# Patient Record
Sex: MALE | Race: WHITE | NOT HISPANIC OR LATINO | URBAN - METROPOLITAN AREA
[De-identification: names, ages, dates, MRNs, and addresses within clinical notes are randomized per-mention and may not be internally consistent; named-entity substitution may affect disease eponyms.]

---

## 2017-02-24 ENCOUNTER — OUTPATIENT (OUTPATIENT)
Dept: OUTPATIENT SERVICES | Facility: HOSPITAL | Age: 72
LOS: 1 days | Discharge: HOME | End: 2017-02-24
Payer: MEDICARE

## 2017-03-02 DIAGNOSIS — F33.9 MAJOR DEPRESSIVE DISORDER, RECURRENT, UNSPECIFIED: ICD-10-CM

## 2017-03-07 PROCEDURE — 90791 PSYCH DIAGNOSTIC EVALUATION: CPT

## 2017-03-29 PROCEDURE — 90870 ELECTROCONVULSIVE THERAPY: CPT

## 2017-03-31 PROCEDURE — 90870 ELECTROCONVULSIVE THERAPY: CPT

## 2017-04-05 PROCEDURE — 90870 ELECTROCONVULSIVE THERAPY: CPT

## 2017-04-07 PROCEDURE — 90870 ELECTROCONVULSIVE THERAPY: CPT

## 2017-04-11 PROCEDURE — 90870 ELECTROCONVULSIVE THERAPY: CPT

## 2017-04-14 PROCEDURE — 90870 ELECTROCONVULSIVE THERAPY: CPT

## 2017-04-17 PROCEDURE — 90870 ELECTROCONVULSIVE THERAPY: CPT

## 2017-04-19 PROCEDURE — 90870 ELECTROCONVULSIVE THERAPY: CPT

## 2017-04-24 PROCEDURE — 90870 ELECTROCONVULSIVE THERAPY: CPT

## 2017-05-01 PROCEDURE — 90870 ELECTROCONVULSIVE THERAPY: CPT

## 2017-05-08 PROCEDURE — 90870 ELECTROCONVULSIVE THERAPY: CPT

## 2017-08-09 ENCOUNTER — TRANSCRIPTION ENCOUNTER (OUTPATIENT)
Age: 72
End: 2017-08-09

## 2017-09-05 ENCOUNTER — APPOINTMENT (OUTPATIENT)
Dept: GERIATRICS | Facility: CLINIC | Age: 72
End: 2017-09-05
Payer: MEDICARE

## 2017-09-05 ENCOUNTER — RX RENEWAL (OUTPATIENT)
Age: 72
End: 2017-09-05

## 2017-09-05 VITALS
HEIGHT: 68.4 IN | SYSTOLIC BLOOD PRESSURE: 120 MMHG | OXYGEN SATURATION: 96 % | HEART RATE: 58 BPM | DIASTOLIC BLOOD PRESSURE: 60 MMHG | RESPIRATION RATE: 20 BRPM | BODY MASS INDEX: 30.56 KG/M2 | WEIGHT: 204 LBS

## 2017-09-05 DIAGNOSIS — Z85.828 PERSONAL HISTORY OF OTHER MALIGNANT NEOPLASM OF SKIN: ICD-10-CM

## 2017-09-05 DIAGNOSIS — Z81.8 FAMILY HISTORY OF OTHER MENTAL AND BEHAVIORAL DISORDERS: ICD-10-CM

## 2017-09-05 DIAGNOSIS — Z78.9 OTHER SPECIFIED HEALTH STATUS: ICD-10-CM

## 2017-09-05 DIAGNOSIS — Z87.891 PERSONAL HISTORY OF NICOTINE DEPENDENCE: ICD-10-CM

## 2017-09-05 DIAGNOSIS — I25.2 OLD MYOCARDIAL INFARCTION: ICD-10-CM

## 2017-09-05 PROCEDURE — 99205 OFFICE O/P NEW HI 60 MIN: CPT

## 2017-09-05 RX ORDER — ALIROCUMAB 75 MG/ML
75 INJECTION, SOLUTION SUBCUTANEOUS
Refills: 0 | Status: ACTIVE | COMMUNITY
Start: 2017-09-05

## 2017-10-24 ENCOUNTER — APPOINTMENT (OUTPATIENT)
Dept: GERIATRICS | Facility: CLINIC | Age: 72
End: 2017-10-24
Payer: MEDICARE

## 2017-10-24 VITALS
WEIGHT: 213 LBS | HEIGHT: 68.4 IN | HEART RATE: 60 BPM | RESPIRATION RATE: 15 BRPM | BODY MASS INDEX: 31.91 KG/M2 | DIASTOLIC BLOOD PRESSURE: 60 MMHG | OXYGEN SATURATION: 97 % | SYSTOLIC BLOOD PRESSURE: 100 MMHG | TEMPERATURE: 97.3 F

## 2017-10-24 LAB
ALBUMIN SERPL ELPH-MCNC: 4.7 G/DL
ALP BLD-CCNC: 63 U/L
ALT SERPL-CCNC: 24 U/L
ANION GAP SERPL CALC-SCNC: 18 MMOL/L
AST SERPL-CCNC: 23 U/L
BASOPHILS # BLD AUTO: 0.01 K/UL
BASOPHILS NFR BLD AUTO: 0.1 %
BILIRUB SERPL-MCNC: 0.6 MG/DL
BUN SERPL-MCNC: 14 MG/DL
CALCIUM SERPL-MCNC: 9.7 MG/DL
CHLORIDE SERPL-SCNC: 101 MMOL/L
CHOLEST SERPL-MCNC: 122 MG/DL
CHOLEST/HDLC SERPL: 1.9 RATIO
CO2 SERPL-SCNC: 23 MMOL/L
CREAT SERPL-MCNC: 0.93 MG/DL
EOSINOPHIL # BLD AUTO: 0.08 K/UL
EOSINOPHIL NFR BLD AUTO: 0.9 %
FOLATE SERPL-MCNC: >20 NG/ML
GLUCOSE SERPL-MCNC: 86 MG/DL
HCT VFR BLD CALC: 42.9 %
HDLC SERPL-MCNC: 65 MG/DL
HGB BLD-MCNC: 14 G/DL
IMM GRANULOCYTES NFR BLD AUTO: 0.1 %
LDLC SERPL CALC-MCNC: 44 MG/DL
LYMPHOCYTES # BLD AUTO: 2.01 K/UL
LYMPHOCYTES NFR BLD AUTO: 23.4 %
MAN DIFF?: NORMAL
MCHC RBC-ENTMCNC: 31 PG
MCHC RBC-ENTMCNC: 32.6 GM/DL
MCV RBC AUTO: 94.9 FL
MONOCYTES # BLD AUTO: 0.94 K/UL
MONOCYTES NFR BLD AUTO: 10.9 %
NEUTROPHILS # BLD AUTO: 5.55 K/UL
NEUTROPHILS NFR BLD AUTO: 64.6 %
PLATELET # BLD AUTO: 127 K/UL
POTASSIUM SERPL-SCNC: 4.7 MMOL/L
PROT SERPL-MCNC: 7 G/DL
RBC # BLD: 4.52 M/UL
RBC # FLD: 13.3 %
SODIUM SERPL-SCNC: 142 MMOL/L
TRIGL SERPL-MCNC: 67 MG/DL
TSH SERPL-ACNC: 1.7 UIU/ML
VIT B12 SERPL-MCNC: 506 PG/ML
WBC # FLD AUTO: 8.6 K/UL

## 2017-10-24 PROCEDURE — 99214 OFFICE O/P EST MOD 30 MIN: CPT | Mod: 25

## 2017-10-24 PROCEDURE — G0008: CPT

## 2017-10-24 PROCEDURE — 90662 IIV NO PRSV INCREASED AG IM: CPT

## 2017-11-08 LAB
BASOPHILS # BLD AUTO: 0.01 K/UL
BASOPHILS NFR BLD AUTO: 0.1 %
EOSINOPHIL # BLD AUTO: 0.15 K/UL
EOSINOPHIL NFR BLD AUTO: 1.6 %
HCT VFR BLD CALC: 41.5 %
HGB BLD-MCNC: 13.6 G/DL
IMM GRANULOCYTES NFR BLD AUTO: 0.2 %
LYMPHOCYTES # BLD AUTO: 2.23 K/UL
LYMPHOCYTES NFR BLD AUTO: 24.3 %
MAN DIFF?: NORMAL
MCHC RBC-ENTMCNC: 31 PG
MCHC RBC-ENTMCNC: 32.8 GM/DL
MCV RBC AUTO: 94.5 FL
MONOCYTES # BLD AUTO: 0.95 K/UL
MONOCYTES NFR BLD AUTO: 10.4 %
NEUTROPHILS # BLD AUTO: 5.8 K/UL
NEUTROPHILS NFR BLD AUTO: 63.4 %
PLATELET # BLD AUTO: 130 K/UL
RBC # BLD: 4.39 M/UL
RBC # FLD: 12.8 %
WBC # FLD AUTO: 9.16 K/UL

## 2018-03-20 ENCOUNTER — APPOINTMENT (OUTPATIENT)
Dept: GERIATRICS | Facility: CLINIC | Age: 73
End: 2018-03-20
Payer: MEDICARE

## 2018-03-20 VITALS
OXYGEN SATURATION: 98 % | HEIGHT: 68.4 IN | WEIGHT: 209.13 LBS | SYSTOLIC BLOOD PRESSURE: 100 MMHG | BODY MASS INDEX: 31.33 KG/M2 | DIASTOLIC BLOOD PRESSURE: 70 MMHG | RESPIRATION RATE: 15 BRPM | HEART RATE: 60 BPM | TEMPERATURE: 97.4 F

## 2018-03-20 PROCEDURE — 99213 OFFICE O/P EST LOW 20 MIN: CPT

## 2018-03-20 RX ORDER — ALPRAZOLAM 0.25 MG/1
0.25 TABLET ORAL
Refills: 0 | Status: ACTIVE | COMMUNITY
Start: 2018-03-20

## 2018-03-20 RX ORDER — EZETIMIBE 10 MG/1
10 TABLET ORAL DAILY
Refills: 0 | Status: ACTIVE | COMMUNITY
Start: 2018-03-20

## 2018-03-20 RX ORDER — LOSARTAN POTASSIUM 50 MG/1
50 TABLET, FILM COATED ORAL DAILY
Qty: 90 | Refills: 3 | Status: ACTIVE | COMMUNITY
Start: 2018-03-20

## 2018-03-20 RX ORDER — ROSUVASTATIN CALCIUM 40 MG/1
40 TABLET, FILM COATED ORAL DAILY
Refills: 0 | Status: ACTIVE | COMMUNITY
Start: 2018-03-20

## 2018-03-20 RX ORDER — ASPIRIN ENTERIC COATED TABLETS 81 MG 81 MG/1
81 TABLET, DELAYED RELEASE ORAL
Refills: 0 | Status: ACTIVE | COMMUNITY
Start: 2018-03-20

## 2018-03-22 LAB
BASOPHILS # BLD AUTO: 0.02 K/UL
BASOPHILS NFR BLD AUTO: 0.2 %
EOSINOPHIL # BLD AUTO: 0.09 K/UL
EOSINOPHIL NFR BLD AUTO: 1 %
HCT VFR BLD CALC: 42.5 %
HGB BLD-MCNC: 14 G/DL
IMM GRANULOCYTES NFR BLD AUTO: 0.1 %
LYMPHOCYTES # BLD AUTO: 2.14 K/UL
LYMPHOCYTES NFR BLD AUTO: 24.8 %
MAN DIFF?: NORMAL
MCHC RBC-ENTMCNC: 31.3 PG
MCHC RBC-ENTMCNC: 32.9 GM/DL
MCV RBC AUTO: 95.1 FL
MONOCYTES # BLD AUTO: 0.87 K/UL
MONOCYTES NFR BLD AUTO: 10.1 %
NEUTROPHILS # BLD AUTO: 5.51 K/UL
NEUTROPHILS NFR BLD AUTO: 63.8 %
PLATELET # BLD AUTO: 117 K/UL
RBC # BLD: 4.47 M/UL
RBC # FLD: 12.8 %
WBC # FLD AUTO: 8.64 K/UL

## 2018-09-18 ENCOUNTER — APPOINTMENT (OUTPATIENT)
Dept: GERIATRICS | Facility: CLINIC | Age: 73
End: 2018-09-18
Payer: MEDICARE

## 2018-09-18 VITALS
HEART RATE: 59 BPM | HEIGHT: 68.4 IN | SYSTOLIC BLOOD PRESSURE: 90 MMHG | RESPIRATION RATE: 15 BRPM | DIASTOLIC BLOOD PRESSURE: 60 MMHG | TEMPERATURE: 97.4 F | OXYGEN SATURATION: 96 % | WEIGHT: 196.19 LBS | BODY MASS INDEX: 29.39 KG/M2

## 2018-09-18 PROCEDURE — 99214 OFFICE O/P EST MOD 30 MIN: CPT

## 2018-09-18 RX ORDER — ASPIRIN 81 MG/1
81 TABLET ORAL DAILY
Refills: 0 | Status: DISCONTINUED | COMMUNITY
Start: 2017-09-05

## 2019-03-12 ENCOUNTER — APPOINTMENT (OUTPATIENT)
Dept: GERIATRICS | Facility: CLINIC | Age: 74
End: 2019-03-12
Payer: MEDICARE

## 2019-03-12 VITALS
TEMPERATURE: 97.6 F | OXYGEN SATURATION: 98 % | SYSTOLIC BLOOD PRESSURE: 110 MMHG | DIASTOLIC BLOOD PRESSURE: 82 MMHG | WEIGHT: 200 LBS | BODY MASS INDEX: 30.05 KG/M2 | HEART RATE: 67 BPM

## 2019-03-12 DIAGNOSIS — Z60.2 PROBLEMS RELATED TO LIVING ALONE: ICD-10-CM

## 2019-03-12 PROCEDURE — 99214 OFFICE O/P EST MOD 30 MIN: CPT

## 2019-03-12 RX ORDER — HYDROCORTISONE 10 MG/G
1 CREAM TOPICAL TWICE DAILY
Qty: 1 | Refills: 0 | Status: ACTIVE | COMMUNITY
Start: 2019-03-12 | End: 1900-01-01

## 2019-03-12 RX ORDER — VORTIOXETINE 20 MG/1
20 TABLET, FILM COATED ORAL
Refills: 0 | Status: ACTIVE | COMMUNITY
Start: 2018-03-20

## 2019-03-12 RX ORDER — OLANZAPINE 5 MG/1
5 TABLET, FILM COATED ORAL
Refills: 0 | Status: ACTIVE | COMMUNITY
Start: 2018-03-20

## 2019-03-12 SDOH — SOCIAL STABILITY - SOCIAL INSECURITY: PROBLEMS RELATED TO LIVING ALONE: Z60.2

## 2019-03-12 NOTE — HISTORY OF PRESENT ILLNESS
[0] : 1) Little interest or pleasure doing things: Not at all [1] : 2) Feeling down, depressed, or hopeless for several days [FreeTextEntry1] : 72 y/o M w/ PMH of CAD, HTN, Depression presents today for 6 month routine f/u visit. No acute complaints today. \par \par CAD/HTN: Was seen in by Cardiology Dr. Kimble 01/15/19. Had lab work done at that time which was wnl. His BP runs around 110/80 at home.  Takes losartan and praluent injections at home. Compliant with medications. No chest pain, no palpitations, no SOB. Exercises every day on treadmill 35 min. Has been on weight watchers and has lost 13lbs. \par \par HLD: takes praluent injections. Has lipid panel done with cardiology with readings within all normal rage. \par \par Depression: sees psychiatry Dr. Britton in [place]; seen last week. Takes olanzapine daily. Was also started on doxepin 4 months ago which has helped his mood.  Denies suicidal/homicidal ideations. \par \par Thrombocytopenia: plt 121 on last labs. Has been chronically low. No acute bleeding. No bruising. \par \par Seborrheic Dermatitis: Rash on outer ears, forearm and hands. States he has been scratching a lot due to itching on his arms.\par \par Lives alone at home. No HHA. No recent hospitalizations or ED visits. No recent falls. Independent of ADLs and IADLs. Drives. No gait assistance. Has children nearby who help and daughter in law who is a nurse practitioner. He works part time collecting rents of real estate homes. \par

## 2019-03-12 NOTE — ASSESSMENT
[Daily physical exercise] : daily physical exercise [Socialization: _____] : socialization: [unfilled] [Medications discussed: _____] : Medications discussed: [unfilled] [Daily physical exercise as tolerated] : Daily physical exercise as tolerated [Weight loss counseling given] : Weight loss counseling given [Social support] : social support [Driving] : driving [Medication Management] : medication management [Lab Results] : lab results [No changes since last discussed ___] : No changes since last discussed  [unfilled]

## 2019-03-12 NOTE — PHYSICAL EXAM
[General Appearance - Alert] : alert [General Appearance - In No Acute Distress] : in no acute distress [General Appearance - Well Nourished] : well nourished [General Appearance - Well Developed] : well developed [Extraocular Movements] : extraocular movements were intact [Normal Oral Mucosa] : normal oral mucosa [] : the neck was supple [Neck Cervical Mass (___cm)] : no neck mass was observed [Auscultation Breath Sounds / Voice Sounds] : lungs were clear to auscultation bilaterally [Heart Rate And Rhythm] : heart rate was normal and rhythm regular [Heart Sounds] : normal S1 and S2 [Murmurs] : no murmurs [Bowel Sounds] : normal bowel sounds [Abdomen Soft] : soft [Abdomen Tenderness] : non-tender [Abdomen Mass (___ Cm)] : no abdominal mass palpated [Cervical Lymph Nodes Enlarged Posterior Bilaterally] : posterior cervical [Cervical Lymph Nodes Enlarged Anterior Bilaterally] : anterior cervical [Supraclavicular Lymph Nodes Enlarged Bilaterally] : supraclavicular [No Focal Deficits] : no focal deficits [Sclera] : the sclera and conjunctiva were normal [PERRL With Normal Accommodation] : pupils were equal in size, round, and reactive to light [Edema] : there was no peripheral edema [Abnormal Walk] : normal gait [Involuntary Movements] : no involuntary movements were seen [Excoriated] : that was excoriated [Upper Extremities] : on the upper extremities [Affect] : the affect was normal [Mood] : the mood was normal [FreeTextEntry1] : bilateral excoriations of forearms with scabs.

## 2019-03-12 NOTE — REVIEW OF SYSTEMS
[Recent Weight Loss (___ Lbs)] : recent [unfilled] ~Ulb weight loss [Skin Lesions] : skin lesion [Itching] : itching [Dry Skin] : dry skin [Depression] : depression [As Noted in HPI] : as noted in HPI [Negative] : Heme/Lymph [Fever] : no fever [Chills] : no chills [Chest Pain] : no chest pain [Palpitations] : no palpitations [Lower Ext Edema] : no extremity edema [Shortness Of Breath] : no shortness of breath [Cough] : no cough [SOB on Exertion] : no shortness of breath during exertion [Abdominal Pain] : no abdominal pain [Constipation] : no constipation [Incontinence] : no incontinence [Hesitancy] : no urinary hesitancy [Joint Pain] : no joint pain [Dizziness] : no dizziness [Suicidal] : not suicidal [Sleep Disturbances] : no sleep disturbances [Anxiety] : no anxiety [Change In Personality] : no personality change [Emotional Problems] : no emotional problems [Muscle Weakness] : no muscle weakness [Easy Bleeding] : no tendency for easy bleeding [Easy Bruising] : no tendency for easy bruising [FreeTextEntry2] : Intentional  [FreeTextEntry4] : itchy ears

## 2019-03-12 NOTE — SOCIAL HISTORY
[No falls in past year] : Patient reported no falls in the past year [Fully functional (bathing, dressing, toileting, transferring, walking, feeding)] : Fully functional (bathing, dressing, toileting, transferring, walking, feeding) [Fully functional (using the telephone, shopping, preparing meals, housekeeping, doing laundry, using transportation,] : Fully functional and needs no help or supervision to perform IADLs (using the telephone, shopping, preparing meals, housekeeping, doing laundry, using transportation, managing medications and managing finances)

## 2019-03-13 LAB
ANION GAP SERPL CALC-SCNC: 10 MMOL/L
BASOPHILS # BLD AUTO: 0.03 K/UL
BASOPHILS NFR BLD AUTO: 0.3 %
BUN SERPL-MCNC: 15 MG/DL
CALCIUM SERPL-MCNC: 9.3 MG/DL
CHLORIDE SERPL-SCNC: 103 MMOL/L
CO2 SERPL-SCNC: 26 MMOL/L
CREAT SERPL-MCNC: 0.97 MG/DL
EOSINOPHIL # BLD AUTO: 0.23 K/UL
EOSINOPHIL NFR BLD AUTO: 2.5 %
GLUCOSE SERPL-MCNC: 89 MG/DL
HCT VFR BLD CALC: 43.6 %
HGB BLD-MCNC: 14.3 G/DL
IMM GRANULOCYTES NFR BLD AUTO: 0.6 %
LYMPHOCYTES # BLD AUTO: 2.04 K/UL
LYMPHOCYTES NFR BLD AUTO: 22.5 %
MAN DIFF?: NORMAL
MCHC RBC-ENTMCNC: 31.9 PG
MCHC RBC-ENTMCNC: 32.8 GM/DL
MCV RBC AUTO: 97.3 FL
MONOCYTES # BLD AUTO: 0.61 K/UL
MONOCYTES NFR BLD AUTO: 6.7 %
NEUTROPHILS # BLD AUTO: 6.1 K/UL
NEUTROPHILS NFR BLD AUTO: 67.4 %
PLATELET # BLD AUTO: 118 K/UL
POTASSIUM SERPL-SCNC: 4.7 MMOL/L
RBC # BLD: 4.48 M/UL
RBC # FLD: 12 %
SODIUM SERPL-SCNC: 139 MMOL/L
WBC # FLD AUTO: 9.06 K/UL

## 2019-09-10 ENCOUNTER — APPOINTMENT (OUTPATIENT)
Dept: GERIATRICS | Facility: CLINIC | Age: 74
End: 2019-09-10
Payer: MEDICARE

## 2019-09-10 VITALS
WEIGHT: 203 LBS | BODY MASS INDEX: 30.77 KG/M2 | HEIGHT: 68 IN | HEART RATE: 67 BPM | OXYGEN SATURATION: 98 % | RESPIRATION RATE: 15 BRPM | DIASTOLIC BLOOD PRESSURE: 70 MMHG | TEMPERATURE: 98.6 F | SYSTOLIC BLOOD PRESSURE: 110 MMHG

## 2019-09-10 DIAGNOSIS — L30.9 DERMATITIS, UNSPECIFIED: ICD-10-CM

## 2019-09-10 PROCEDURE — 99214 OFFICE O/P EST MOD 30 MIN: CPT

## 2019-09-10 RX ORDER — DOXEPIN HYDROCHLORIDE 75 MG/1
75 CAPSULE ORAL
Refills: 0 | Status: ACTIVE | COMMUNITY
Start: 2019-03-12

## 2019-09-10 NOTE — END OF VISIT
[] : Resident [FreeTextEntry3] : the patient was seen and examined in entirety. The patient is a 73-year-old man with history of coronary disease, hypertension, depression. He has no complaints.He denies chest pain shortness of breath or palpitations.He has excellent exercise tolerance. Will proceed as outlined above.\par

## 2019-09-10 NOTE — HISTORY OF PRESENT ILLNESS
[FreeTextEntry1] : 72 y/o M w/ PMH of CAD, HTN, HLD, Thrombocytopenia (Last Platelet levels was 118K/uL last visit) , Depression presents today for 6 month routine f/u visit. Currently refers that he recently had stress test with his Cardiologist Dr Kimble. NST was negative for ischemia. Normal LV EF. Recent blood work reviewed showing Lipid Panel WNL. Platelets level stable at 111K with no episode of bleeding. No GI or  symptoms. No falls.  \par \par Social Status: Lives alone at home. No HHA. No recent hospitalizations or ED visits. No recent falls. \par Functional Status wise: Independent of ADLs and IADLs. Currently drives and does 20 minutes of  exercise.  \par

## 2019-09-10 NOTE — PHYSICAL EXAM
[General Appearance - Alert] : alert [Sclera] : the sclera and conjunctiva were normal [General Appearance - In No Acute Distress] : in no acute distress [Normal Oral Mucosa] : normal oral mucosa [Neck Appearance] : the appearance of the neck was normal [] : no respiratory distress [Auscultation Breath Sounds / Voice Sounds] : lungs were clear to auscultation bilaterally [Heart Sounds] : normal S1 and S2 [Heart Rate And Rhythm] : heart rate was normal and rhythm regular [Heart Sounds Gallop] : no gallops [Murmurs] : no murmurs [Heart Sounds Pericardial Friction Rub] : no pericardial rub [Bowel Sounds] : normal bowel sounds [Abdomen Soft] : soft [Abdomen Tenderness] : non-tender [Oriented To Time, Place, And Person] : oriented to person, place, and time [Impaired Insight] : insight and judgment were intact [Affect] : the affect was normal [TWNoteComboBox1] : Negative: 3 recalled words

## 2020-03-10 ENCOUNTER — APPOINTMENT (OUTPATIENT)
Dept: GERIATRICS | Facility: CLINIC | Age: 75
End: 2020-03-10
Payer: MEDICARE

## 2020-03-10 VITALS
BODY MASS INDEX: 32.28 KG/M2 | DIASTOLIC BLOOD PRESSURE: 78 MMHG | OXYGEN SATURATION: 98 % | TEMPERATURE: 97.6 F | HEART RATE: 79 BPM | WEIGHT: 213 LBS | HEIGHT: 68 IN | RESPIRATION RATE: 14 BRPM | SYSTOLIC BLOOD PRESSURE: 120 MMHG

## 2020-03-10 DIAGNOSIS — D69.6 THROMBOCYTOPENIA, UNSPECIFIED: ICD-10-CM

## 2020-03-10 DIAGNOSIS — Z23 ENCOUNTER FOR IMMUNIZATION: ICD-10-CM

## 2020-03-10 DIAGNOSIS — I10 ESSENTIAL (PRIMARY) HYPERTENSION: ICD-10-CM

## 2020-03-10 DIAGNOSIS — E78.5 HYPERLIPIDEMIA, UNSPECIFIED: ICD-10-CM

## 2020-03-10 DIAGNOSIS — I25.10 ATHEROSCLEROTIC HEART DISEASE OF NATIVE CORONARY ARTERY W/OUT ANGINA PECTORIS: ICD-10-CM

## 2020-03-10 DIAGNOSIS — F32.9 MAJOR DEPRESSIVE DISORDER, SINGLE EPISODE, UNSPECIFIED: ICD-10-CM

## 2020-03-10 DIAGNOSIS — F41.9 ANXIETY DISORDER, UNSPECIFIED: ICD-10-CM

## 2020-03-10 PROCEDURE — G0008: CPT

## 2020-03-10 PROCEDURE — 90662 IIV NO PRSV INCREASED AG IM: CPT

## 2020-03-10 PROCEDURE — 99213 OFFICE O/P EST LOW 20 MIN: CPT

## 2020-03-10 NOTE — PHYSICAL EXAM
[General Appearance - Alert] : alert [General Appearance - In No Acute Distress] : in no acute distress [Sclera] : the sclera and conjunctiva were normal [PERRL With Normal Accommodation] : pupils were equal in size, round, and reactive to light [Extraocular Movements] : extraocular movements were intact [Normal Oral Mucosa] : normal oral mucosa [No Oral Pallor] : no oral pallor [No Oral Cyanosis] : no oral cyanosis [Outer Ear] : the ears and nose were normal in appearance [Oropharynx] : The oropharynx was normal [Neck Appearance] : the appearance of the neck was normal [Neck Cervical Mass (___cm)] : no neck mass was observed [Jugular Venous Distention Increased] : there was no jugular-venous distention [Thyroid Diffuse Enlargement] : the thyroid was not enlarged [Thyroid Nodule] : there were no palpable thyroid nodules [Auscultation Breath Sounds / Voice Sounds] : lungs were clear to auscultation bilaterally [Heart Rate And Rhythm] : heart rate was normal and rhythm regular [Heart Sounds] : normal S1 and S2 [Heart Sounds Gallop] : no gallops [Murmurs] : no murmurs [Heart Sounds Pericardial Friction Rub] : no pericardial rub [Full Pulse] : the pedal pulses are present [Edema] : there was no peripheral edema [Breast Appearance] : normal in appearance [Breast Palpation Mass] : no palpable masses [Bowel Sounds] : normal bowel sounds [Abdomen Soft] : soft [Abdomen Tenderness] : non-tender [Abdomen Mass (___ Cm)] : no abdominal mass palpated [No CVA Tenderness] : no ~M costovertebral angle tenderness [No Spinal Tenderness] : no spinal tenderness [Skin Color & Pigmentation] : normal skin color and pigmentation [Skin Turgor] : normal skin turgor [] : no rash [Oriented To Time, Place, And Person] : oriented to person, place, and time [Impaired Insight] : insight and judgment were intact [Affect] : the affect was normal

## 2020-03-10 NOTE — HISTORY OF PRESENT ILLNESS
[2] : 2) Feeling down, depressed, or hopeless for more than half of the days [FreeTextEntry1] : 73 yo male here for fu of cad, htn, hyperlipidemia.  Pt feel well. 1 mile daily on treadmill. No sob no cp.

## 2020-03-10 NOTE — ASSESSMENT
[FreeTextEntry1] : 75 yo male with hx of depression , anxiety, cad,hyperliidemia. Thrombocytopenia.  Will give flu shot today.  Pt advised to get colonoscopy this year.

## 2020-03-12 ENCOUNTER — TRANSCRIPTION ENCOUNTER (OUTPATIENT)
Age: 75
End: 2020-03-12

## 2020-03-30 ENCOUNTER — APPOINTMENT (OUTPATIENT)
Dept: OTOLARYNGOLOGY | Facility: CLINIC | Age: 75
End: 2020-03-30

## 2020-06-10 LAB
ANION GAP SERPL CALC-SCNC: 12 MMOL/L
BASOPHILS # BLD AUTO: 0.03 K/UL
BASOPHILS NFR BLD AUTO: 0.4 %
BUN SERPL-MCNC: 14 MG/DL
CALCIUM SERPL-MCNC: 9.3 MG/DL
CHLORIDE SERPL-SCNC: 103 MMOL/L
CO2 SERPL-SCNC: 26 MMOL/L
CREAT SERPL-MCNC: 0.9 MG/DL
EOSINOPHIL # BLD AUTO: 0.13 K/UL
EOSINOPHIL NFR BLD AUTO: 1.7 %
GLUCOSE SERPL-MCNC: 104 MG/DL
HCT VFR BLD CALC: 46.7 %
HGB BLD-MCNC: 15.1 G/DL
IMM GRANULOCYTES NFR BLD AUTO: 0.3 %
LYMPHOCYTES # BLD AUTO: 2.15 K/UL
LYMPHOCYTES NFR BLD AUTO: 28.8 %
MAN DIFF?: NORMAL
MCHC RBC-ENTMCNC: 31.1 PG
MCHC RBC-ENTMCNC: 32.3 GM/DL
MCV RBC AUTO: 96.1 FL
MONOCYTES # BLD AUTO: 0.59 K/UL
MONOCYTES NFR BLD AUTO: 7.9 %
NEUTROPHILS # BLD AUTO: 4.54 K/UL
NEUTROPHILS NFR BLD AUTO: 60.9 %
PLATELET # BLD AUTO: 113 K/UL
POTASSIUM SERPL-SCNC: 4.4 MMOL/L
RBC # BLD: 4.86 M/UL
RBC # FLD: 11.9 %
SODIUM SERPL-SCNC: 141 MMOL/L
WBC # FLD AUTO: 7.46 K/UL

## 2021-03-25 ENCOUNTER — OUTPATIENT (OUTPATIENT)
Dept: OUTPATIENT SERVICES | Facility: HOSPITAL | Age: 76
LOS: 1 days | Discharge: HOME | End: 2021-03-25

## 2021-04-02 NOTE — ECT CONSULT NOTE - NSECTMENTALSTATUSEXAM_PSY_ALL_CORE
Conscious, cooperative, alert. Registered and recalled 3/3 words  Speech: regular rate and rhythm,   Mood: "Depressed"   Thought Process: linear, goal directed   Thought Content: No Death wish, No Suicidal ideation/intent/plan, No homicidal ideation/intent/plan. No delusions   Perception: No hallucinations   Insight and Judgement: fair  Impulse Control: fair at this time.

## 2021-04-02 NOTE — ECT CONSULT NOTE - NSECTASSESSRECOMM_PSY_ALL_CORE
A course of ECT is indicated.    The patient encounter was from 14:15 pm to 15:15 pm.      More than 50% of the time was spent in counselling and/or coordination of care, including but not limited to discussing with patient the risks and benefits of ECT, the usual trajectory of response with acute course of ECT, obtaining informed consent for ECT, reviewing ECT fasting guidelines, reviewing the need for periodic history and physical and labwork. Patient was asked to obtain labwork (CBC, BMP, COVID-19), EKG and CARDIOLOGY clearance.     He was advised to hold lamotrigine dose on day of tx.    Referring psychiatrist was contacted, left message.

## 2021-04-02 NOTE — ECT CONSULT NOTE - OTHER PAST PSYCHIATRIC HISTORY (INCLUDE DETAILS REGARDING ONSET, COURSE OF ILLNESS, INPATIENT/OUTPATIENT TREATMENT)
Patient was interviewed by phone at his preference and with his consent, patient at home in [facility], writer in [facility].     76 year old father of four with 10 grandchildren, domiciled, not / has girlfriend of 10 years, past psychiatric history of major depressive disorder, recurrent/severe without psychotic features, had 11 ECT in 2017 (9 acute and 2 maintenance, bifrontal, with good result); no hospitalizations or suicide attempts recorded; no history of violence, legal, or substance use disorder noted.     Depression began in the 5th decade of life but episodes have become more severe in the last several years. 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 C/O depressed mood "down in the dumps," anhedonia (would usually enjoy going out with his girlfriend), anergia, insomnia (improved with quetiapine), guilt "I'm not doing well in my business," and passive suicidal ideation without active suicidal ideation or intent/plan. Denied homicidal ideation, appetite changes, concentration or memory difficulties.     C/O mild anxiety "a little bit," no panic attacks.

## 2021-04-02 NOTE — ECT CONSULT NOTE - NSSUICPROTFACT_PSY_ALL_CORE
Responsibility to children, family, or others/Identifies reasons for living/Supportive social network of family or friends/Fear of death or the actual act of killing self/Cultural, spiritual and/or moral attitudes against suicide/Engaged in work or school/Positive therapeutic relationships/Ability to cope with stress/Frustration tolerance

## 2021-04-02 NOTE — ECT CONSULT NOTE - CURRENT MEDICATION
MEDICATIONS  (STANDING): trintellix 20 mg qam, lamotrigine 50 mg qam, ezetimibe 10 mg qam, losartan 25 mg qam, rosuvastatin 40 mg qam, ASA 81 mg qam, alprazolam 2.5 mg qam and 1.25 bid    MEDICATIONS  (PRN):

## 2021-07-12 ENCOUNTER — TRANSCRIPTION ENCOUNTER (OUTPATIENT)
Age: 76
End: 2021-07-12

## 2021-07-25 ENCOUNTER — TRANSCRIPTION ENCOUNTER (OUTPATIENT)
Age: 76
End: 2021-07-25

## 2021-10-25 ENCOUNTER — EMERGENCY (EMERGENCY)
Facility: HOSPITAL | Age: 76
LOS: 1 days | Discharge: ANOTHER HOSPITAL | End: 2021-10-25
Attending: EMERGENCY MEDICINE | Admitting: EMERGENCY MEDICINE
Payer: MEDICARE

## 2021-10-25 VITALS
SYSTOLIC BLOOD PRESSURE: 141 MMHG | DIASTOLIC BLOOD PRESSURE: 84 MMHG | HEART RATE: 74 BPM | WEIGHT: 177.91 LBS | OXYGEN SATURATION: 98 % | RESPIRATION RATE: 20 BRPM | HEIGHT: 68 IN | TEMPERATURE: 98 F

## 2021-10-25 LAB
ANION GAP SERPL CALC-SCNC: 9 MMOL/L — SIGNIFICANT CHANGE UP (ref 5–17)
BASOPHILS # BLD AUTO: 0.02 K/UL — SIGNIFICANT CHANGE UP (ref 0–0.2)
BASOPHILS NFR BLD AUTO: 0.3 % — SIGNIFICANT CHANGE UP (ref 0–2)
BUN SERPL-MCNC: 22 MG/DL — SIGNIFICANT CHANGE UP (ref 7–23)
CALCIUM SERPL-MCNC: 9.2 MG/DL — SIGNIFICANT CHANGE UP (ref 8.4–10.5)
CHLORIDE SERPL-SCNC: 98 MMOL/L — SIGNIFICANT CHANGE UP (ref 96–108)
CO2 SERPL-SCNC: 26 MMOL/L — SIGNIFICANT CHANGE UP (ref 22–31)
CREAT SERPL-MCNC: 1.17 MG/DL — SIGNIFICANT CHANGE UP (ref 0.5–1.3)
EOSINOPHIL # BLD AUTO: 0.12 K/UL — SIGNIFICANT CHANGE UP (ref 0–0.5)
EOSINOPHIL NFR BLD AUTO: 1.5 % — SIGNIFICANT CHANGE UP (ref 0–6)
GLUCOSE SERPL-MCNC: 101 MG/DL — HIGH (ref 70–99)
HCT VFR BLD CALC: 42.5 % — SIGNIFICANT CHANGE UP (ref 39–50)
HGB BLD-MCNC: 13.8 G/DL — SIGNIFICANT CHANGE UP (ref 13–17)
IMM GRANULOCYTES NFR BLD AUTO: 0.3 % — SIGNIFICANT CHANGE UP (ref 0–1.5)
LYMPHOCYTES # BLD AUTO: 1.65 K/UL — SIGNIFICANT CHANGE UP (ref 1–3.3)
LYMPHOCYTES # BLD AUTO: 20.8 % — SIGNIFICANT CHANGE UP (ref 13–44)
MCHC RBC-ENTMCNC: 30.2 PG — SIGNIFICANT CHANGE UP (ref 27–34)
MCHC RBC-ENTMCNC: 32.5 GM/DL — SIGNIFICANT CHANGE UP (ref 32–36)
MCV RBC AUTO: 93 FL — SIGNIFICANT CHANGE UP (ref 80–100)
MONOCYTES # BLD AUTO: 0.73 K/UL — SIGNIFICANT CHANGE UP (ref 0–0.9)
MONOCYTES NFR BLD AUTO: 9.2 % — SIGNIFICANT CHANGE UP (ref 2–14)
NEUTROPHILS # BLD AUTO: 5.41 K/UL — SIGNIFICANT CHANGE UP (ref 1.8–7.4)
NEUTROPHILS NFR BLD AUTO: 67.9 % — SIGNIFICANT CHANGE UP (ref 43–77)
NRBC # BLD: 0 /100 WBCS — SIGNIFICANT CHANGE UP (ref 0–0)
PLATELET # BLD AUTO: 140 K/UL — LOW (ref 150–400)
POTASSIUM SERPL-MCNC: 4.6 MMOL/L — SIGNIFICANT CHANGE UP (ref 3.5–5.3)
POTASSIUM SERPL-SCNC: 4.6 MMOL/L — SIGNIFICANT CHANGE UP (ref 3.5–5.3)
RBC # BLD: 4.57 M/UL — SIGNIFICANT CHANGE UP (ref 4.2–5.8)
RBC # FLD: 13.1 % — SIGNIFICANT CHANGE UP (ref 10.3–14.5)
SODIUM SERPL-SCNC: 133 MMOL/L — LOW (ref 135–145)
WBC # BLD: 7.95 K/UL — SIGNIFICANT CHANGE UP (ref 3.8–10.5)
WBC # FLD AUTO: 7.95 K/UL — SIGNIFICANT CHANGE UP (ref 3.8–10.5)

## 2021-10-25 PROCEDURE — 99285 EMERGENCY DEPT VISIT HI MDM: CPT

## 2021-10-25 PROCEDURE — 70450 CT HEAD/BRAIN W/O DYE: CPT | Mod: 26,MA

## 2021-10-25 RX ORDER — ROSUVASTATIN CALCIUM 5 MG/1
1 TABLET ORAL
Qty: 0 | Refills: 0 | DISCHARGE

## 2021-10-25 RX ORDER — SODIUM CHLORIDE 9 MG/ML
1000 INJECTION INTRAMUSCULAR; INTRAVENOUS; SUBCUTANEOUS ONCE
Refills: 0 | Status: COMPLETED | OUTPATIENT
Start: 2021-10-25 | End: 2021-10-25

## 2021-10-25 RX ORDER — EZETIMIBE 10 MG/1
1 TABLET ORAL
Qty: 0 | Refills: 0 | DISCHARGE

## 2021-10-25 RX ORDER — ASPIRIN/CALCIUM CARB/MAGNESIUM 324 MG
1 TABLET ORAL
Qty: 0 | Refills: 0 | DISCHARGE

## 2021-10-25 RX ORDER — ALPRAZOLAM 0.25 MG
0 TABLET ORAL
Qty: 0 | Refills: 0 | DISCHARGE

## 2021-10-25 RX ORDER — LAMOTRIGINE 25 MG/1
0 TABLET, ORALLY DISINTEGRATING ORAL
Qty: 0 | Refills: 0 | DISCHARGE

## 2021-10-25 RX ORDER — QUETIAPINE FUMARATE 200 MG/1
0 TABLET, FILM COATED ORAL
Qty: 0 | Refills: 0 | DISCHARGE

## 2021-10-25 RX ORDER — VORTIOXETINE 5 MG/1
1 TABLET, FILM COATED ORAL
Qty: 0 | Refills: 0 | DISCHARGE

## 2021-10-25 RX ORDER — BUPROPION HYDROCHLORIDE 150 MG/1
1 TABLET, EXTENDED RELEASE ORAL
Qty: 0 | Refills: 0 | DISCHARGE

## 2021-10-25 RX ADMIN — SODIUM CHLORIDE 1000 MILLILITER(S): 9 INJECTION INTRAMUSCULAR; INTRAVENOUS; SUBCUTANEOUS at 23:46

## 2021-10-25 NOTE — ED PROVIDER NOTE - CARE PLAN
Principal Discharge DX:	Subdural hematoma  Secondary Diagnosis:	Subarachnoid hematoma   1 Principal Discharge DX:	Subdural hematoma  Secondary Diagnosis:	Subarachnoid hematoma  Secondary Diagnosis:	Scalp abrasion

## 2021-10-25 NOTE — ED ADULT NURSE NOTE - CAS DISCH ACCOMP BY
Patient came to lobby and said she is seeing ortho on Monday, 10/18 and needs note from PCP giving ortho order. Pt is waiting for the copy.  Please cosign ortho order and return message. thanks   Self

## 2021-10-25 NOTE — ED PROVIDER NOTE - NSICDXPASTMEDICALHX_GEN_ALL_CORE_FT
PAST MEDICAL HISTORY:  Anxiety     CAD (coronary artery disease) of artery bypass graft     High cholesterol

## 2021-10-25 NOTE — ED ADULT NURSE NOTE - OBJECTIVE STATEMENT
Slipped on gravel and fell backwards, abrasion to alexander of head. No n/v, no LOC.  Denies any complaints.

## 2021-10-25 NOTE — ED PROVIDER NOTE - OBJECTIVE STATEMENT
76 y.o. M fell, states he was on the side of the road, a car was coming so he moved over, but slipped on the gravel, bystander (), helped pt home and called pt's son stating the pt seemed a bit disoriented and unsteady on his feet, fall occurred a few hours ago, when son arrived at the house, also noted pt more unsteady than usual and a little foggy on the details of the fall, does seem to be improving that way, but still more unsteady than usual, pt denies neck/back pain, denies pain in extremities 76 y.o. M fell, states he was on the side of the road, a car was coming so he moved over, but slipped on the gravel, bystander (), helped pt home and called pt's son stating the pt seemed a bit disoriented and unsteady on his feet, no LOC, fall occurred a few hours ago, when son arrived at the house, also noted pt more unsteady than usual and a little foggy on the details of the fall, does seem to be improving that way, but still more unsteady than usual, pt denies neck/back pain, denies pain in extremities, now in ED, son states mental status normal, pt continues to feel more "wobbly" than normal

## 2021-10-25 NOTE — ED PROVIDER NOTE - CLINICAL SUMMARY MEDICAL DECISION MAKING FREE TEXT BOX
fall, witnessed by PD, no LOC, initial report of disorientation - per son resolved, persistent issue is unsteady gait, pt lives alone - iv, basic labs, urine, ct head r/o ich, wound care by RN

## 2021-10-25 NOTE — ED ADULT NURSE NOTE - NSIMPLEMENTINTERV_GEN_ALL_ED
Implemented All Fall Risk Interventions:  [place] to call system. Call bell, personal items and telephone within reach. Instruct patient to call for assistance. Room bathroom lighting operational. Non-slip footwear when patient is off stretcher. Physically safe environment: no spills, clutter or unnecessary equipment. Stretcher in lowest position, wheels locked, appropriate side rails in place. Provide visual cue, wrist band, yellow gown, etc. Monitor gait and stability. Monitor for mental status changes and reorient to person, place, and time. Review medications for side effects contributing to fall risk. Reinforce activity limits and safety measures with patient and family.

## 2021-10-26 ENCOUNTER — INPATIENT (INPATIENT)
Facility: HOSPITAL | Age: 76
LOS: 0 days | Discharge: HOME | DRG: 87 | End: 2021-10-27
Attending: NEUROLOGICAL SURGERY | Admitting: NEUROLOGICAL SURGERY
Payer: MEDICARE

## 2021-10-26 VITALS
RESPIRATION RATE: 18 BRPM | DIASTOLIC BLOOD PRESSURE: 76 MMHG | HEART RATE: 70 BPM | OXYGEN SATURATION: 98 % | TEMPERATURE: 98 F | SYSTOLIC BLOOD PRESSURE: 160 MMHG

## 2021-10-26 VITALS
TEMPERATURE: 98 F | SYSTOLIC BLOOD PRESSURE: 146 MMHG | OXYGEN SATURATION: 96 % | RESPIRATION RATE: 20 BRPM | HEART RATE: 76 BPM | DIASTOLIC BLOOD PRESSURE: 85 MMHG

## 2021-10-26 DIAGNOSIS — S06.5X9A TRAUMATIC SUBDURAL HEMORRHAGE WITH LOSS OF CONSCIOUSNESS OF UNSPECIFIED DURATION, INITIAL ENCOUNTER: ICD-10-CM

## 2021-10-26 DIAGNOSIS — Z95.1 PRESENCE OF AORTOCORONARY BYPASS GRAFT: Chronic | ICD-10-CM

## 2021-10-26 LAB
ALBUMIN SERPL ELPH-MCNC: 4.4 G/DL — SIGNIFICANT CHANGE UP (ref 3.3–5.2)
ALP SERPL-CCNC: 81 U/L — SIGNIFICANT CHANGE UP (ref 40–120)
ALT FLD-CCNC: 16 U/L — SIGNIFICANT CHANGE UP
ANION GAP SERPL CALC-SCNC: 16 MMOL/L — SIGNIFICANT CHANGE UP (ref 5–17)
APPEARANCE UR: CLEAR — SIGNIFICANT CHANGE UP
APTT BLD: 28 SEC — SIGNIFICANT CHANGE UP (ref 27.5–35.5)
AST SERPL-CCNC: 26 U/L — SIGNIFICANT CHANGE UP
BASOPHILS # BLD AUTO: 0.02 K/UL — SIGNIFICANT CHANGE UP (ref 0–0.2)
BASOPHILS NFR BLD AUTO: 0.2 % — SIGNIFICANT CHANGE UP (ref 0–2)
BILIRUB SERPL-MCNC: 0.6 MG/DL — SIGNIFICANT CHANGE UP (ref 0.4–2)
BILIRUB UR-MCNC: NEGATIVE — SIGNIFICANT CHANGE UP
BUN SERPL-MCNC: 19.6 MG/DL — SIGNIFICANT CHANGE UP (ref 8–20)
CALCIUM SERPL-MCNC: 9.1 MG/DL — SIGNIFICANT CHANGE UP (ref 8.6–10.2)
CHLORIDE SERPL-SCNC: 97 MMOL/L — LOW (ref 98–107)
CO2 SERPL-SCNC: 22 MMOL/L — SIGNIFICANT CHANGE UP (ref 22–29)
COLOR SPEC: YELLOW — SIGNIFICANT CHANGE UP
CREAT SERPL-MCNC: 0.99 MG/DL — SIGNIFICANT CHANGE UP (ref 0.5–1.3)
DIFF PNL FLD: ABNORMAL
EOSINOPHIL # BLD AUTO: 0.11 K/UL — SIGNIFICANT CHANGE UP (ref 0–0.5)
EOSINOPHIL NFR BLD AUTO: 1.2 % — SIGNIFICANT CHANGE UP (ref 0–6)
GLUCOSE SERPL-MCNC: 86 MG/DL — SIGNIFICANT CHANGE UP (ref 70–99)
GLUCOSE UR QL: NEGATIVE MG/DL — SIGNIFICANT CHANGE UP
HCT VFR BLD CALC: 41.8 % — SIGNIFICANT CHANGE UP (ref 39–50)
HGB BLD-MCNC: 14.1 G/DL — SIGNIFICANT CHANGE UP (ref 13–17)
IMM GRANULOCYTES NFR BLD AUTO: 0.4 % — SIGNIFICANT CHANGE UP (ref 0–1.5)
INR BLD: 1.16 RATIO — SIGNIFICANT CHANGE UP (ref 0.88–1.16)
KETONES UR-MCNC: NEGATIVE — SIGNIFICANT CHANGE UP
LEUKOCYTE ESTERASE UR-ACNC: NEGATIVE — SIGNIFICANT CHANGE UP
LYMPHOCYTES # BLD AUTO: 1.94 K/UL — SIGNIFICANT CHANGE UP (ref 1–3.3)
LYMPHOCYTES # BLD AUTO: 21.2 % — SIGNIFICANT CHANGE UP (ref 13–44)
MCHC RBC-ENTMCNC: 30.9 PG — SIGNIFICANT CHANGE UP (ref 27–34)
MCHC RBC-ENTMCNC: 33.7 GM/DL — SIGNIFICANT CHANGE UP (ref 32–36)
MCV RBC AUTO: 91.5 FL — SIGNIFICANT CHANGE UP (ref 80–100)
MONOCYTES # BLD AUTO: 0.82 K/UL — SIGNIFICANT CHANGE UP (ref 0–0.9)
MONOCYTES NFR BLD AUTO: 8.9 % — SIGNIFICANT CHANGE UP (ref 2–14)
NEUTROPHILS # BLD AUTO: 6.24 K/UL — SIGNIFICANT CHANGE UP (ref 1.8–7.4)
NEUTROPHILS NFR BLD AUTO: 68.1 % — SIGNIFICANT CHANGE UP (ref 43–77)
NITRITE UR-MCNC: NEGATIVE — SIGNIFICANT CHANGE UP
PH UR: 6 — SIGNIFICANT CHANGE UP (ref 5–8)
PLATELET # BLD AUTO: 150 K/UL — SIGNIFICANT CHANGE UP (ref 150–400)
POTASSIUM SERPL-MCNC: 4.7 MMOL/L — SIGNIFICANT CHANGE UP (ref 3.5–5.3)
POTASSIUM SERPL-SCNC: 4.7 MMOL/L — SIGNIFICANT CHANGE UP (ref 3.5–5.3)
PROT SERPL-MCNC: 7.1 G/DL — SIGNIFICANT CHANGE UP (ref 6.6–8.7)
PROT UR-MCNC: 15 MG/DL
PROTHROM AB SERPL-ACNC: 13.4 SEC — SIGNIFICANT CHANGE UP (ref 10.6–13.6)
RBC # BLD: 4.57 M/UL — SIGNIFICANT CHANGE UP (ref 4.2–5.8)
RBC # FLD: 13.2 % — SIGNIFICANT CHANGE UP (ref 10.3–14.5)
SARS-COV-2 RNA SPEC QL NAA+PROBE: SIGNIFICANT CHANGE UP
SODIUM SERPL-SCNC: 134 MMOL/L — LOW (ref 135–145)
SP GR SPEC: 1.01 — SIGNIFICANT CHANGE UP (ref 1.01–1.02)
UROBILINOGEN FLD QL: NEGATIVE MG/DL — SIGNIFICANT CHANGE UP
WBC # BLD: 9.17 K/UL — SIGNIFICANT CHANGE UP (ref 3.8–10.5)
WBC # FLD AUTO: 9.17 K/UL — SIGNIFICANT CHANGE UP (ref 3.8–10.5)

## 2021-10-26 PROCEDURE — 99222 1ST HOSP IP/OBS MODERATE 55: CPT

## 2021-10-26 PROCEDURE — 81001 URINALYSIS AUTO W/SCOPE: CPT

## 2021-10-26 PROCEDURE — 87635 SARS-COV-2 COVID-19 AMP PRB: CPT

## 2021-10-26 PROCEDURE — 85025 COMPLETE CBC W/AUTO DIFF WBC: CPT

## 2021-10-26 PROCEDURE — 70450 CT HEAD/BRAIN W/O DYE: CPT | Mod: 26,MH

## 2021-10-26 PROCEDURE — 93010 ELECTROCARDIOGRAM REPORT: CPT

## 2021-10-26 PROCEDURE — 80048 BASIC METABOLIC PNL TOTAL CA: CPT

## 2021-10-26 PROCEDURE — 71045 X-RAY EXAM CHEST 1 VIEW: CPT

## 2021-10-26 PROCEDURE — 99285 EMERGENCY DEPT VISIT HI MDM: CPT | Mod: 25

## 2021-10-26 PROCEDURE — 87086 URINE CULTURE/COLONY COUNT: CPT

## 2021-10-26 PROCEDURE — 70450 CT HEAD/BRAIN W/O DYE: CPT | Mod: MA

## 2021-10-26 PROCEDURE — 99291 CRITICAL CARE FIRST HOUR: CPT

## 2021-10-26 PROCEDURE — 36415 COLL VENOUS BLD VENIPUNCTURE: CPT

## 2021-10-26 PROCEDURE — 72170 X-RAY EXAM OF PELVIS: CPT | Mod: 26

## 2021-10-26 PROCEDURE — 99285 EMERGENCY DEPT VISIT HI MDM: CPT

## 2021-10-26 PROCEDURE — 71045 X-RAY EXAM CHEST 1 VIEW: CPT | Mod: 26

## 2021-10-26 RX ORDER — LAMOTRIGINE 25 MG/1
100 TABLET, ORALLY DISINTEGRATING ORAL DAILY
Refills: 0 | Status: DISCONTINUED | OUTPATIENT
Start: 2021-10-26 | End: 2021-10-27

## 2021-10-26 RX ORDER — LABETALOL HCL 100 MG
10 TABLET ORAL
Refills: 0 | Status: DISCONTINUED | OUTPATIENT
Start: 2021-10-26 | End: 2021-10-27

## 2021-10-26 RX ORDER — BUPROPION HYDROCHLORIDE 150 MG/1
300 TABLET, EXTENDED RELEASE ORAL DAILY
Refills: 0 | Status: DISCONTINUED | OUTPATIENT
Start: 2021-10-26 | End: 2021-10-27

## 2021-10-26 RX ORDER — LEVETIRACETAM 250 MG/1
500 TABLET, FILM COATED ORAL EVERY 12 HOURS
Refills: 0 | Status: DISCONTINUED | OUTPATIENT
Start: 2021-10-26 | End: 2021-10-26

## 2021-10-26 RX ORDER — ALPRAZOLAM 0.25 MG
0.25 TABLET ORAL EVERY 6 HOURS
Refills: 0 | Status: DISCONTINUED | OUTPATIENT
Start: 2021-10-26 | End: 2021-10-27

## 2021-10-26 RX ORDER — ACETAMINOPHEN 500 MG
650 TABLET ORAL EVERY 6 HOURS
Refills: 0 | Status: DISCONTINUED | OUTPATIENT
Start: 2021-10-26 | End: 2021-10-27

## 2021-10-26 RX ORDER — QUETIAPINE FUMARATE 200 MG/1
25 TABLET, FILM COATED ORAL AT BEDTIME
Refills: 0 | Status: DISCONTINUED | OUTPATIENT
Start: 2021-10-26 | End: 2021-10-27

## 2021-10-26 RX ORDER — SENNA PLUS 8.6 MG/1
2 TABLET ORAL AT BEDTIME
Refills: 0 | Status: DISCONTINUED | OUTPATIENT
Start: 2021-10-26 | End: 2021-10-27

## 2021-10-26 RX ORDER — ATORVASTATIN CALCIUM 80 MG/1
80 TABLET, FILM COATED ORAL AT BEDTIME
Refills: 0 | Status: DISCONTINUED | OUTPATIENT
Start: 2021-10-26 | End: 2021-10-27

## 2021-10-26 RX ORDER — ONDANSETRON 8 MG/1
4 TABLET, FILM COATED ORAL ONCE
Refills: 0 | Status: COMPLETED | OUTPATIENT
Start: 2021-10-26 | End: 2021-10-26

## 2021-10-26 RX ORDER — SODIUM CHLORIDE 9 MG/ML
1000 INJECTION INTRAMUSCULAR; INTRAVENOUS; SUBCUTANEOUS
Refills: 0 | Status: DISCONTINUED | OUTPATIENT
Start: 2021-10-26 | End: 2021-10-26

## 2021-10-26 RX ADMIN — QUETIAPINE FUMARATE 25 MILLIGRAM(S): 200 TABLET, FILM COATED ORAL at 21:31

## 2021-10-26 RX ADMIN — SODIUM CHLORIDE 1000 MILLILITER(S): 9 INJECTION INTRAMUSCULAR; INTRAVENOUS; SUBCUTANEOUS at 00:45

## 2021-10-26 RX ADMIN — Medication 650 MILLIGRAM(S): at 06:48

## 2021-10-26 RX ADMIN — LEVETIRACETAM 500 MILLIGRAM(S): 250 TABLET, FILM COATED ORAL at 05:28

## 2021-10-26 RX ADMIN — Medication 650 MILLIGRAM(S): at 05:28

## 2021-10-26 RX ADMIN — LAMOTRIGINE 100 MILLIGRAM(S): 25 TABLET, ORALLY DISINTEGRATING ORAL at 11:56

## 2021-10-26 RX ADMIN — ATORVASTATIN CALCIUM 80 MILLIGRAM(S): 80 TABLET, FILM COATED ORAL at 21:30

## 2021-10-26 RX ADMIN — SODIUM CHLORIDE 75 MILLILITER(S): 9 INJECTION INTRAMUSCULAR; INTRAVENOUS; SUBCUTANEOUS at 06:06

## 2021-10-26 RX ADMIN — BUPROPION HYDROCHLORIDE 300 MILLIGRAM(S): 150 TABLET, EXTENDED RELEASE ORAL at 14:20

## 2021-10-26 NOTE — CHART NOTE - NSCHARTNOTEFT_GEN_A_CORE
Spoke with patients cardiologist Dr. Gilmer Araujo [phone] [place], he said the aspirin can be held for 2 weeks and then restart at 81mg daily, f/u in Cardiology office 2 weeks, f/u with nsg 2 weeks

## 2021-10-26 NOTE — H&P ADULT - ATTENDING COMMENTS
76M, history of HLD on medications and history of CABG in 5/2020 on ASA post procedure, had a fall today on gravel and hit the back of his head, no LOC, presented to [place] and was discovered to have a SAH.  Transferred to [facility] for tertiary care.  Denies headache, denies pain elsewhere, denies dizziness, blurred vision.    Patient with isolated neuro trauma admit to neuro ICU

## 2021-10-26 NOTE — PHYSICAL THERAPY INITIAL EVALUATION ADULT - GENERAL OBSERVATIONS, REHAB EVAL
Pt received supine in bed, + IV + telemetry/ + Venous Compression Boots. C/o 0/10 pre and Post PT pain, Pt agreeable to PT.

## 2021-10-26 NOTE — CONSULT NOTE ADULT - ASSESSMENT
IMP:  TRIP AND FALL   NO LOC  TAKES 325 ASPIRIN QD    NO COMPLAINTS CURRENT    NEURO EXAM INTACT ALL   GCS=15       CT HEAD WITH Small left temporal subarachnoid hemorrhage and small subdural hematoma along the left tentorial leaflet.      PLAN:   ADMIT ICU  ACS EVAL  Q1H NEURO CKS  CT HEAD  6 HRS FROM FIRST SCAN OR STAT FOR ANY NEURO STATUS CHANGE  SBP<140  NO ANTICOAGULINS NO ANTIPLATELETS, NO SEDATION  OR NARCOTICS

## 2021-10-26 NOTE — PROGRESS NOTE ADULT - ASSESSMENT
ASSESSMENT/PLAN: 76yoM h/o CABG 2020 presented with small SDH and     NEURO:   SBP<160  q2hr neurochecks  repeat CTH for stability  continue home LMT, LEV, and seroquel  pain mgt: tylenol and oxy 5 prn  Activity: [x] mobilize as tolerated [] Bedrest [x] PT [x] OT [] PMNR    PULM: room air   SpO2>92%  incentive spirometry   OOB    CV:  SBP goal <160  will discuss need for ASA with cardiology  statin    RENAL: monitor I/O  replete lytes prn  voiding  Fluids: IVF while NPO    GI:  Diet: advance diet as tolerated  GI prophylaxis [x] not indicated   zofran prn for nausea  Bowel regimen [x] senna [] other:    ENDO:   Goal euglycemia (-180)    HEME/ONC:  VTE prophylaxis: [] SCDs [x] chemoprophylaxis held 24hrs post-bleed     ID: afebrile   no leukocytosis    MISC:    I affirm that this patient is critically ill and at high risk for sudden, fatal deterioration due to one or more of the above stated active issues.     This time does not include bedside procedures that are documented separately.   ASSESSMENT/PLAN: 76yoM h/o CABG 2020 presented with small traumatic SDH      NEURO:   SBP<160  q4hr neurochecks  repeat CTH for stability  continue home LMT and seroquel  pain mgt: tylenol and oxy 5 prn  Activity: [x] mobilize as tolerated [] Bedrest [x] PT [x] OT [] PMNR    PULM: room air   SpO2>92%  incentive spirometry   OOB    CV:  SBP goal <160  will discuss need for ASA with cardiology- hold 2 weeks  statin    RENAL: monitor I/O  replete lytes prn  voiding  Fluids: IVF while NPO    GI:  Diet: advance diet as tolerated  GI prophylaxis [x] not indicated   zofran prn for nausea  Bowel regimen [x] senna [] other:    ENDO:   Goal euglycemia (-180)    HEME/ONC:  VTE prophylaxis: [] SCDs [x] chemoprophylaxis held 24hrs post-bleed     ID: afebrile   no leukocytosis    MISC:    I affirm that this patient is critically ill and at high risk for sudden, fatal deterioration due to one or more of the above stated active issues.     This time does not include bedside procedures that are documented separately.

## 2021-10-26 NOTE — CONSULT NOTE ADULT - ATTENDING COMMENTS
NSGY Attg:    see above    patient seen and examined    agree with exam as above    CT reviewed    agree with plan as above   per cards ok to hold ASA x 2 weeks  will follow

## 2021-10-26 NOTE — ED ADULT NURSE NOTE - OBJECTIVE STATEMENT
A&Ox4, RR even and unlabored. speaking in full coherent sentences. Skin is warm and dry.   Laceration to posterior side of head.  Pt coming to Ed a s transfer from [facility].  Pt was a trip and fall from standing height onto gravel.  hit the back of his head.  Pt found with SAH.  Neuro intact.  Denies pain, dizziness or weakness.  On CM, VS WNL.  Son at bedside. Will continue ot monitor.

## 2021-10-26 NOTE — H&P ADULT - ASSESSMENT
76 year old M, mechanical fall today, transfer from [facility] with a SAH    Neurosurgery review emergently  Repeat labs   Tertiary survey        76 year old M, mechanical fall today, transfer from [facility] with a SAH    Neurosurgery review emergently  Repeat labs   Trauma cleared

## 2021-10-26 NOTE — CONSULT NOTE ADULT - SUBJECTIVE AND OBJECTIVE BOX
CC: Patient is a 76y old  Male who presents with a chief complaint of Mechanical Fall on ASA, Transfer from [place], [facility] (26 Oct 2021 03:00)    SOURCE; PATIENT HIMSELF, COMPETENT, SON AT BEDSIDE AND CHART   HPI:  76M, history of HLD on medications and history of CABG in 5/2020 on ASA post procedure, had a fall today on gravel and hit the back of his head, no LOC, presented to [place] and was discovered to have a SDH AND  SAH.  Transferred to [facility] for tertiary care.  Denies headache, denies pain elsewhere, denies dizziness, blurred vision.   (26 Oct 2021 03:00)  NO LOC, GCS=15    pt c/o + -headache  /10 on the pain scale    - Nausea /- Vomiting   denies weakness   denies numbness/ tingling  denies visual changes   denies C/T/LS  Spine pain  no dizziness,  no balance change    no recent illness     PAST MEDICAL:  HTN  CAD   DEPRESSION   ANXIETY  THROMBOCYTOPENIA, ATHEROSCLEROSIS,   DERMATITIS  PROSTATE CA  OBESE   Hyperlipidemia      SURGICAL HISTORY:      5/21   CABG x 4        SOCIAL HISTORY: - EtOH, + tobacco 1ppd x's 10 yrs, quit 40 yrs ago ,  - drugs    FAMILY HISTORY: denies       ROS:  CONSTITUTIONAL: No fever, weight loss, or fatigue  EYES: No eye pain, visual disturbances, or discharge  ENMT:  No difficulty hearing, tinnitus, vertigo; No sinus or throat pain  NECK: No pain or stiffness  RESPIRATORY: No cough, wheezing, chills or hemoptysis; No shortness of breath  CARDIOVASCULAR: No chest pain, palpitations, dizziness, or leg swelling  GASTROINTESTINAL: No abdominal or epigastric pain. No nausea, vomiting, or hematemesis; No diarrhea or constipation. No melena or hematochezia.  GENITOURINARY: No dysuria, frequency, hematuria, or incontinence  NEUROLOGICAL: No headaches, memory loss, loss of strength, numbness, or tremors  SKIN: No itching, burning, rashes, or lesions   LYMPH NODES: No enlarged glands  ENDOCRINE: No heat or cold intolerance; No hair loss  MUSCULOSKELETAL: No joint pain or swelling; No muscle, back, or extremity pain  PSYCHIATRIC: No depression, anxiety, mood swings, or difficulty sleeping  HEME/LYMPH: No easy bruising, or bleeding gums  ALLERGY AND IMMUNOLOGIC: No hives or eczema      MEDICATIONS  (home ): aspirin 325 qd, ezetimibe 10mgqd, rosuvastatin 40mgqd, Praluent [id number], [id number], [id number],, bupropion xl 300mgqd,, [id number],     MEDICATIONS  (PRN): alprazolam 0.25 prn  qid       Allergies:  No Known Allergies    Vital Signs Last 24 Hrs  T(C): 36.8 (26 Oct 2021 02:35), Max: 36.8 (26 Oct 2021 02:35)  T(F): 98.3 (26 Oct 2021 02:35), Max: 98.3 (26 Oct 2021 02:35)  HR: 76 (26 Oct 2021 02:35) (76 - 76)  BP: 146/85 (26 Oct 2021 02:35) (146/85 - 146/85)  BP(mean): --  RR: 20 (26 Oct 2021 02:35) (20 - 20)  SpO2: 96% (26 Oct 2021 02:35) (96% - 96%)    PHYSICAL EXAM:  GENERAL: NAD, well-groomed, well-developed  HEAD: Superficial abrasions, and contusions posterior parietal scalp Normocephalic  EYES: EOMI, PERRLA, conjunctiva and sclera clear  ENMT:  Moist mucous membranes, Good dentition  NECK: Supple, No JVD  NERVOUS SYSTEM:  Alert & Oriented X3, Good concentration;  perrla, eomi  memory intact   cranial nerves 2-12 intact, slight hearing defect ( baseline)  gross visual acuity and fields intact,    Motor Strength 5/5 B/L upper and lower extremities;   sensory intact all   fine motor and coordination intact  no pronator drift  gcs=15  Spine: No C/T/L SPINE tenderness   CHEST/LUNG: Clear  bilaterally; No rales, rhonchi, wheezing, or rubs  HEART: Regular rate   ABDOMEN: Soft, Nontender, Nondistended; Bowel sounds present  EXTREMITIES:  2+ Peripheral Pulses, No clubbing, cyanosis, or edema  LYMPH: No lymphadenopathy noted  SKIN: No rashes or lesions      RADIOLOGY & ADDITIONAL STUDIES:         EXAM: CT BRAIN  PROCEDURE DATE: 10/25/2021  INTERPRETATION: CLINICAL INFORMATION: Head injury.  TECHNIQUE: Noncontrast axial CT images of the brain were acquired from the base of skull to vertex.  COMPARISON: None.  FINDINGS: Small volume left temporal subarachnoid hemorrhage (2:17, 4:39). There is small layering subdural hematoma along the left tentorial leaflet. The grey-white differentiation is maintained.  Mild to moderate periventricular and subcortical white matter hypoattenuation without mass effect is noted, non-specific, but likely related to chronic small vessel ischemic changes. Cerebral volume loss is present with proportional prominence of the sulci and ventricles.  No mass effect or midline shift is seen. Basal cisterns are not effaced.  Small left maxillary sinus retention cyst versus polyp. The tympanomastoid cavities are clear.  No osseous abnormality seen.  IMPRESSION:  Small left temporal subarachnoid hemorrhage and small subdural hematoma along the left tentorial leaflet.                       14.1   9.17  )-----------( 150      ( 26 Oct 2021 02:57 )             41.8           PT/INR - ( 26 Oct 2021 02:57 )   PT: 13.4 sec;   INR: 1.16 ratio         PTT - ( 26 Oct 2021 02:57 )  PTT:28.0 sec

## 2021-10-26 NOTE — ED PROVIDER NOTE - OBJECTIVE STATEMENT
77 yo male presents for evaluation of subdural hematoma secondary to slip and fall earlier this evening and striking the back of his bead. Patient was taken to [facility] then transferred to [place] for further management. Patient currently has no complaints.

## 2021-10-26 NOTE — ED ADULT TRIAGE NOTE - CHIEF COMPLAINT QUOTE
transfer from [place] S/P fall onto gravel earlier this afternoon. Hit the back of his head.   no LOC.  On aspirin daily.  PT with subdural bleed.  Neuro intact.  Denies pain at this time. misty BUTLER activated.

## 2021-10-26 NOTE — H&P ADULT - HISTORY OF PRESENT ILLNESS
76M, history of HLD on medications and history of CABG in 5/2020 on ASA post procedure, had a fall today on gravel and hit the back of his head, no LOC, presented to [place] and was discovered to have a SAH.  Transferred to [facility] for tertiary care.  Denies headache, denies pain elsewhere, denies dizziness, blurred vision.

## 2021-10-26 NOTE — ED PROVIDER NOTE - NS ED MD DISPO DIVISION
Outpatient Speech Language Pathology   Adult Swallow Initial Evaluation   Judy     Patient Name: Corky Wan  : 1958  MRN: [id number]  Today's Date: 2020         Visit Date: 2020   Patient Active Problem List   Diagnosis   • Itching   • Atopic dermatitis   • Tonsil cancer (CMS/[facility])        Past Medical History:   Diagnosis Date   • Acute pharyngitis    • Anxiety    • Cancer (CMS/[facility])  and     skin cancer; resolved   • Cancer (CMS/[facility]) 2020    tonsil R and nodes   • Hyperlipidemia    • Hypertension    • Skin cancer         Past Surgical History:   Procedure Laterality Date   • CATARACT EXTRACTION   and    • ESOPHAGOSCOPY N/A 2020    Procedure: ESOPHAGOSCOPY;  Surgeon: Andrews Singh MD;  Location: [facility] OR;  Service: ENT;  Laterality: N/A;   • LARYNGOSCOPY N/A 2020    Procedure: LARYNGOSCOPY;  Surgeon: Andrews Singh MD;  Location: [facility] OR;  Service: ENT;  Laterality: N/A;   • NECK DISSECTION Right 2020    Procedure: MODIFIED NECK DISSECTION RIGHT;  Surgeon: Andrews Singh MD;  Location: [facility] OR;  Service: ENT;  Laterality: Right;   • SKIN CANCER EXCISION      right shoulder, right side of face   • TONSILLECTOMY Right 2020    Procedure: TONISLLECTOMY RIGHT;  Surgeon: Andrews Singh MD;  Location: [facility] OR;  Service: ENT;  Laterality: Right;         Visit Dx:     ICD-10-CM ICD-9-CM   1. Dysphagia, unspecified type R13.10 787.20   2. Tonsil cancer (CMS/[facility]) C09.9 146.0   3. Vocal cord paralysis, unilateral complete J38.01 478.32           SLP Adult Swallow Evaluation     Row Name 20 1300       Rehab Evaluation    Document Type  evaluation  -HG    Subjective Information  no complaints  -HG    Patient Observations  alert;cooperative;agree to therapy  -HG    Patient Effort  excellent  -HG    Symptoms Noted During/After Treatment  none  -HG    Symptoms Noted, Comment  Pt does not vocal fatigue by the end of the day as  well as incoordinating breathing due to the vocal fold paralysis.  -HG       General Information    Patient Profile Reviewed  yes  -HG    Pertinent History Of Current Problem  Pt is a 66 year old with recent R tonsil cancer s/p removal of right tonsil and lymph nodes and neck dissection. Pt to initiate radiation on 6/8/20.  -HG    Current Method of Nutrition  regular textures;thin liquids  -HG    Precautions/Limitations, Vision  WFL;for purposes of eval  -HG    Precautions/Limitations, Hearing  WFL;for purposes of eval  -HG    Prior Level of Function-Communication  WFL  -HG    Prior Level of Function-Swallowing  no diet consistency restrictions  -HG    Plans/Goals Discussed with  patient  -HG    Barriers to Rehab  none identified  -HG    Patient's Goals for Discharge  eat/drink without coughing/choking  -HG       Oral Motor and Function    Dentition Assessment  natural, present and adequate  -HG    Secretion Management  WNL/WFL  -HG    Mucosal Quality  moist, healthy;other (see comments) initially but pt does report dry mouth at times.  -HG    Gag Response  WFL  -HG    Volitional Swallow  WFL  -HG    Volitional Cough  WFL  -HG       Oral Musculature and Cranial Nerve Assessment    Oral Motor General Assessment  WFL  -HG       General Eating/Swallowing Observations    Respiratory Support Currently in Use  room air  -HG    Eating/Swallowing Skills  self-fed  -HG    Positioning During Eating  upright 90 degree;upright in chair  -HG    Utensils Used  spoon;cup;straw  -HG    Consistencies Trialed  regular textures;pureed;thin liquids  -HG       Respiratory    Respiratory Status  WFL;room air  -HG       Clinical Swallow Eval    Oral Prep Phase  WFL  -HG    Oral Transit  WFL  -HG    Oral Residue  WFL  -HG    Pharyngeal Phase  no overt signs/symptoms of pharyngeal impairment  -HG    Esophageal Phase  unremarkable  -HG    Clinical Swallow Evaluation Summary  Clinical Swallow Evaluation completed this date. Pt states that he  "does cough at times and reports \"backsplash of liquids\" but with a repeat swallow, gets it down. Pt tolerated all consistencies this date with no s/s of aspiration: water via cup and straw, applesauce and gabe crackers. Pt did not sensation of gabe cracker and with a repeat swallow and liquid wash, pt cleared it. SLP RECs MBS to evaluate swallow function and safety with R vocal fold paralysis.   -      User Key  (r) = Recorded By, (t) = Taken By, (c) = Cosigned By    Initials Name Provider Type    Teri Garcia MS CCC-SLP Speech and Language Pathologist                        OP SLP Education     Row Name 06/02/20 1300       Education    Barriers to Learning  No barriers identified  -    Education Provided  Described results of evaluation;Patient expressed understanding of evaluation;Patient participated in establishing goals and treatment plan;Patient demonstrated recommended strategies;Patient requires further education on strategies, risks  -    Assessed  Learning needs;Learning motivation;Learning preferences;Learning readiness  -    Learning Motivation  Strong  -    Learning Method  Explanation;Demonstration;Teach back;Written materials  -    Teaching Response  Verbalized understanding;Demonstrated understanding;Reinforcement needed  -    Education Comments  SLP demonstrated with pt teachback all prophylactic swallowing exercises.   -      User Key  (r) = Recorded By, (t) = Taken By, (c) = Cosigned By    Initials Name Effective Dates    Teri Garcia MS CCC-SLP 06/22/15 -           SLP OP Goals     Row Name 06/02/20 1300          Goal Type Needed    Goal Type Needed  Dysphagia;Voice;Other Adult Goals  -        Subjective Comments    Subjective Comments  Pt alert, cooperative, aware of his voice and swallowing deficits.  -        Dysphagia Goals    Patient will safely consume the recommended diet without complications such as aspiration pneumonia  regular/thin   -HG     " Status: Patient will safely consume the recommended diet without complications such as aspiration pneumonia  New  -HG     Patient will increase laryngeal elevation to reduce residue that might fall into airway by completing  Mendelsohn maneuver;super-supraglottic swallow;falsetto/pitch glide;with cues  -HG     Status: Patient will increase laryngeal elevation to reduce residue that might fall into airway by completing  New  -HG     Patient will increase closure of larynx to keep food from falling into the airway by completing  super-supraglottic swallow;with cues  -HG     Status: Patient will increase closure of larynx to keep food from falling into the airway by completing  New  -HG     Patient will increase strength of tongue base and posterior pharyngeal walls to reduce residue that might fall into airway by completing  effotful swallow;Glory (tongue hold);with cues  -HG     Status: Patient will increase strength of tongue base and posterior pharyngeal walls to reduce residue that might fall into airway by completing  New  -HG     Patient will improve hyolaryngeal elevation via completing Marv (head lift)  sustained lift (comment #/duration of lifts);repetitive lift (comment #/duration of lifts);with cues 30 secs and 30 reps  -HG     Status: Patient will improve hyolaryngeal elevation via completing Marv (head lift)  New  -HG        Voice Goals    Patient will be able to engage in speech at the conversational level in all settings, using functional phonation, acceptable habitual pitch and balanced tone focus.  90%:;with intermittent cues  -HG     Status: Patient will be able to engage in speech at the conversational level in all settings, using functional phonation, acceptable habitual pitch and balanced tone focus.  New  -HG     Patient will reduce hyperfunctional use of the vocal mechanism via laryngeal massage and/or other relaxation techniques for the external muscles of the neck, shoulders and chest   80%  -HG     Status: Patient will reduce hyperfunctional use of the vocal mechanism via laryngeal massage and/or other relaxation techniques for the external muscles of the neck, shoulders and chest  New  -HG     Patient will be able to use a balanced vocal resonance  80%:;with cues  -HG     Status: Patient will be able to use a balanced vocal resonance  New  -HG     Patient will be able to use functional phonation  80%:;with cues  -HG     Status: Patient will be able to use functional phonation  New  -HG        Other Goals    Other Adult Goal- 1  Pt will participate in MBSS in order to further assess swallow fxn with recs to follow as indicated.  -HG     Status: Other Adult Goal- 1  New  -HG        SLP Time Calculation    SLP Goal Re-Cert Due Date  07/02/20  -HG       User Key  (r) = Recorded By, (t) = Taken By, (c) = Cosigned By    Initials Name Provider Type    Teri Garcia MS CCC-SLP Speech and Language Pathologist          OP SLP Assessment/Plan - 06/02/20 1300        SLP Assessment    Functional Problems  Swallowing;Voice   -HG    Impact on Function: Swallowing  Risk of aspiration;Risk of pneumonia;Impact on social aspects of eating   -HG    Clinical Impression: Swallowing  Mild:;dysphagia unspecified   -HG    Clinical Impression- Voice  Moderate voice disorder;Severe voice disorder   -HG    Clinical Impression- PVFM  Does not appear to present with PVFM   -HG    Functional Problems Comment  Pt coughing at times and difficulty with solids.   -HG    Clinical Impression Comments  Due to vocal fold paralysis and recent tonsilectomy, pt is at an increased risk for dysphagia and aspiration.    -HG    Please refer to paper survey for additional self-reported information  Yes   -HG    Please refer to items scanned into chart for additional diagnostic informaiton and handouts as provided by clinician  Yes   -HG    SLP Diagnosis  Mild dysphagia and Mod-Severe voice disorder   -HG    Prognosis  Excellent  (comment)   -    Patient/caregiver participated in establishment of treatment plan and goals  Yes   -HG    Patient would benefit from skilled therapy intervention  Yes   -HG       SLP Plan    Frequency  1x/week   -HG    Duration  12 weeks   -HG    Planned CPT's?  SLP CLINICAL SWALLOW EVAL: 67928;SLP SWALLOW THERAPY: 42976;SLP INDIVIDUAL SPEECH THERAPY: 86204   -    Expected Duration Therapy Session - minutes  45-60 minutes   -HG    Plan Comments  Initiate dysphagia and voice tx.    -      User Key  (r) = Recorded By, (t) = Taken By, (c) = Cosigned By    Initials Name Provider Type     Teri Christie MS CCC-SLP Speech and Language Pathologist                    Time Calculation:   SLP Start Time: 1300    Therapy Charges for Today     Code Description Service Date Service Provider Modifiers Qty    60890064312 HC ST EVAL ORAL PHARYNG SWALLOW 4 2020 Teri Christie MS CCC-SLP GN 1    35010833588 HC ST EVAL SPEECH AND PROD W LANG  2 2020 Teri Christie MS CCC-SLP GN 1                   Teri Christie MS CCC-SLP  2020 and Outpatient Speech Language Pathology   Adult Voice Initial Evaluation  [facility]     Patient Name: Corky Wan  : 1958  MRN: [id number]  Today's Date: 2020         Visit Date: 2020   Patient Active Problem List   Diagnosis   • Itching   • Atopic dermatitis   • Tonsil cancer (CMS/[facility])        Past Medical History:   Diagnosis Date   • Acute pharyngitis    • Anxiety    • Cancer (CMS/[facility])  and     skin cancer; resolved   • Cancer (CMS/[facility]) 2020    tonsil R and nodes   • Hyperlipidemia    • Hypertension    • Skin cancer         Past Surgical History:   Procedure Laterality Date   • CATARACT EXTRACTION   and    • ESOPHAGOSCOPY N/A 2020    Procedure: ESOPHAGOSCOPY;  Surgeon: Andrews Singh MD;  Location: [facility];  Service: ENT;  Laterality: N/A;   • LARYNGOSCOPY N/A 2020    Procedure: LARYNGOSCOPY;  Surgeon:  Andrews Singh MD;  Location:  [facility] OR;  Service: ENT;  Laterality: N/A;   • NECK DISSECTION Right 4/30/2020    Procedure: MODIFIED NECK DISSECTION RIGHT;  Surgeon: Andrews Singh MD;  Location:  [facility] OR;  Service: ENT;  Laterality: Right;   • SKIN CANCER EXCISION      right shoulder, right side of face   • TONSILLECTOMY Right 4/30/2020    Procedure: TONISLLECTOMY RIGHT;  Surgeon: Andrews Singh MD;  Location:  [facility] OR;  Service: ENT;  Laterality: Right;         Visit Dx:    ICD-10-CM ICD-9-CM   1. Dysphagia, unspecified type R13.10 787.20   2. Tonsil cancer (CMS/[facility]) C09.9 146.0   3. Vocal cord paralysis, unilateral complete J38.01 478.32                VOICE ASSESSMENT (last 72 hours)      Voice Assessment     Row Name 06/02/20 1300                   Voice Assessment/Intervention    Patient/Family Observations  Pt with new dx of R TVC paralysis (6/1/20)  and aware of his hoarseness in addition to his swallowing concerns.  -HG        Comment  Possible candidate for an in-office gel injection per ENT report.   -HG           Voice Handicap Index    Functional Subtest Score  19  -HG        Physical Subtest Score  23  -HG        Emotional Subtest Score  17  -HG        Total Score  59  -HG           Vocal Hygiene    Daily Water Intake  3-4 glasses (17-32 oz.)  -HG        Daily Alcohol Servings  2 a day  -HG        Smoking History  Nonsmoker  -HG           Voice Assessment/Intervention    Quality and Resonance (Voice)  Breathy;Hoarse;Rough  -HG        Respiration/Breath Support  Maximum phonation time  -HG        Maximum phonation time (MPT) averages  10.77  -HG        Muscle Tension Observation  Neck  -HG        Voice Handicap Index (VHI)  yes  -HG           Voice Handicap Index (VHI)    Functional Subtest  19  -HG        Physical Subtest  23  -HG        Emotional Subtest  17  -HG        VHI TOTAL SCORE  59  -HG           SLP Clinical Impression    SLP Voice Diagnosis  moderate to severe voice disorder   -HG        Paradoxical Vocal Fold Movement Impression  Does not appear to present with PVFM  -HG        SLP Voice Diagnosis Comments  Pt with diagnosis of right vocal fold paralysis  -HG        Rehab Potential/Prognosis  excellent  -HG        SLP Criteria for Skilled Therapy Intervention  yes  -HG          User Key  (r) = Recorded By, (t) = Taken By, (c) = Cosigned By    Initials Name Effective Dates     Teri Christie KELLIE, MS [facility]-SLP 06/22/15 -             SLP Adult Swallow Evaluation     Row Name 06/02/20 1300       Rehab Evaluation    Document Type  evaluation  -HG    Subjective Information  no complaints  -HG    Patient Observations  alert;cooperative;agree to therapy  -HG    Patient Effort  excellent  -HG    Symptoms Noted During/After Treatment  none  -HG    Symptoms Noted, Comment  Pt does not vocal fatigue by the end of the day as well as incoordinating breathing due to the vocal fold paralysis.  -HG       General Information    Patient Profile Reviewed  yes  -HG    Pertinent History Of Current Problem  Pt is a 66 year old with recent R tonsil cancer s/p removal of right tonsil and lymph nodes and neck dissection. Pt to initiate radiation on 6/8/20.  -HG    Current Method of Nutrition  regular textures;thin liquids  -HG    Precautions/Limitations, Vision  WFL;for purposes of eval  -HG    Precautions/Limitations, Hearing  WFL;for purposes of eval  -HG    Prior Level of Function-Communication  WFL  -HG    Prior Level of Function-Swallowing  no diet consistency restrictions  -HG    Plans/Goals Discussed with  patient  -HG    Barriers to Rehab  none identified  -HG    Patient's Goals for Discharge  eat/drink without coughing/choking  -HG       Oral Motor and Function    Dentition Assessment  natural, present and adequate  -HG    Secretion Management  WNL/WFL  -HG    Mucosal Quality  moist, healthy;other (see comments) initially but pt does report dry mouth at times.  -HG    Gag Response  WFL  -HG    Volitional  "Swallow  WFL  -HG    Volitional Cough  WFL  -HG       Oral Musculature and Cranial Nerve Assessment    Oral Motor General Assessment  WFL  -HG       General Eating/Swallowing Observations    Respiratory Support Currently in Use  room air  -    Eating/Swallowing Skills  self-fed  -HG    Positioning During Eating  upright 90 degree;upright in chair  -    Utensils Used  spoon;cup;straw  -HG    Consistencies Trialed  regular textures;pureed;thin liquids  -HG       Respiratory    Respiratory Status  WFL;room air  -HG       Clinical Swallow Eval    Oral Prep Phase  WFL  -HG    Oral Transit  WFL  -HG    Oral Residue  WFL  -HG    Pharyngeal Phase  no overt signs/symptoms of pharyngeal impairment  -HG    Esophageal Phase  unremarkable  -HG    Clinical Swallow Evaluation Summary  Clinical Swallow Evaluation completed this date. Pt states that he does cough at times and reports \"backsplash of liquids\" but with a repeat swallow, gets it down. Pt tolerated all consistencies this date with no s/s of aspiration: water via cup and straw, applesauce and gabe crackers. Pt did not sensation of gabe cracker and with a repeat swallow and liquid wash, pt cleared it. SLP RECs MBS to evaluate swallow function and safety with R vocal fold paralysis.   -      User Key  (r) = Recorded By, (t) = Taken By, (c) = Cosigned By    Initials Name Provider Type     Teri Christie MS CCC-SLP Speech and Language Pathologist                  OP SLP Education     Row Name 06/02/20 1300       Education    Barriers to Learning  No barriers identified  -    Education Provided  Described results of evaluation;Patient expressed understanding of evaluation;Patient participated in establishing goals and treatment plan;Patient demonstrated recommended strategies;Patient requires further education on strategies, risks  -    Assessed  Learning needs;Learning motivation;Learning preferences;Learning readiness  -    Learning Motivation  Strong  " -    Learning Method  Explanation;Demonstration;Teach back;Written materials  -    Teaching Response  Verbalized understanding;Demonstrated understanding;Reinforcement needed  -    Education Comments  SLP demonstrated with pt teachback all prophylactic swallowing exercises.   -      User Key  (r) = Recorded By, (t) = Taken By, (c) = Cosigned By    Initials Name Effective Dates    HG Teri Christie, MS [facility]-SLP 06/22/15 -         SLP OP Goals     Row Name 06/02/20 1300          Goal Type Needed    Goal Type Needed  Dysphagia;Voice;Other Adult Goals  -        Subjective Comments    Subjective Comments  Pt alert, cooperative, aware of his voice and swallowing deficits.  -HG        Dysphagia Goals    Patient will safely consume the recommended diet without complications such as aspiration pneumonia  regular/thin   -HG     Status: Patient will safely consume the recommended diet without complications such as aspiration pneumonia  New  -HG     Patient will increase laryngeal elevation to reduce residue that might fall into airway by completing  Mendelsohn maneuver;super-supraglottic swallow;falsetto/pitch glide;with cues  -HG     Status: Patient will increase laryngeal elevation to reduce residue that might fall into airway by completing  New  -HG     Patient will increase closure of larynx to keep food from falling into the airway by completing  super-supraglottic swallow;with cues  -HG     Status: Patient will increase closure of larynx to keep food from falling into the airway by completing  New  -HG     Patient will increase strength of tongue base and posterior pharyngeal walls to reduce residue that might fall into airway by completing  effotful swallow;Glory (tongue hold);with cues  -HG     Status: Patient will increase strength of tongue base and posterior pharyngeal walls to reduce residue that might fall into airway by completing  New  -HG     Patient will improve hyolaryngeal elevation via  completing Marv (head lift)  sustained lift (comment #/duration of lifts);repetitive lift (comment #/duration of lifts);with cues 30 secs and 30 reps  -HG     Status: Patient will improve hyolaryngeal elevation via completing Marv (head lift)  New  -HG        Voice Goals    Patient will be able to engage in speech at the conversational level in all settings, using functional phonation, acceptable habitual pitch and balanced tone focus.  90%:;with intermittent cues  -HG     Status: Patient will be able to engage in speech at the conversational level in all settings, using functional phonation, acceptable habitual pitch and balanced tone focus.  New  -HG     Patient will reduce hyperfunctional use of the vocal mechanism via laryngeal massage and/or other relaxation techniques for the external muscles of the neck, shoulders and chest  80%  -HG     Status: Patient will reduce hyperfunctional use of the vocal mechanism via laryngeal massage and/or other relaxation techniques for the external muscles of the neck, shoulders and chest  New  -HG     Patient will be able to use a balanced vocal resonance  80%:;with cues  -HG     Status: Patient will be able to use a balanced vocal resonance  New  -HG     Patient will be able to use functional phonation  80%:;with cues  -HG     Status: Patient will be able to use functional phonation  New  -HG        Other Goals    Other Adult Goal- 1  Pt will participate in MBSS in order to further assess swallow fxn with recs to follow as indicated.  -HG     Status: Other Adult Goal- 1  New  -HG        SLP Time Calculation    SLP Goal Re-Cert Due Date  07/02/20  -HG       User Key  (r) = Recorded By, (t) = Taken By, (c) = Cosigned By    Initials Name Provider Type    Teri Garcia MS CCC-SLP Speech and Language Pathologist        OP SLP Assessment/Plan - 06/02/20 1300        SLP Assessment    Functional Problems  Swallowing;Voice   -HG    Impact on Function: Swallowing  Risk of  aspiration;Risk of pneumonia;Impact on social aspects of eating   -HG    Clinical Impression: Swallowing  Mild:;dysphagia unspecified   -HG    Clinical Impression- Voice  Moderate voice disorder;Severe voice disorder   -HG    Clinical Impression- PVFM  Does not appear to present with PVFM   -HG    Functional Problems Comment  Pt coughing at times and difficulty with solids.   -HG    Clinical Impression Comments  Due to vocal fold paralysis and recent tonsilectomy, pt is at an increased risk for dysphagia and aspiration.    -HG    Please refer to paper survey for additional self-reported information  Yes   -HG    Please refer to items scanned into chart for additional diagnostic informaiton and handouts as provided by clinician  Yes   -HG    SLP Diagnosis  Mild dysphagia and Mod-Severe voice disorder   -HG    Prognosis  Excellent (comment)   -HG    Patient/caregiver participated in establishment of treatment plan and goals  Yes   -HG    Patient would benefit from skilled therapy intervention  Yes   -HG       SLP Plan    Frequency  1x/week   -HG    Duration  12 weeks   -HG    Planned CPT's?  SLP CLINICAL SWALLOW EVAL: 69702;SLP SWALLOW THERAPY: 97074;SLP INDIVIDUAL SPEECH THERAPY: 98771   -    Expected Duration Therapy Session - minutes  45-60 minutes   -HG    Plan Comments  Initiate dysphagia and voice tx.    -HG      User Key  (r) = Recorded By, (t) = Taken By, (c) = Cosigned By    Initials Name Provider Type     Teri Christie MS CCC-SLP Speech and Language Pathologist               Time Calculation:   SLP Start Time: 1300    Therapy Charges for Today     Code Description Service Date Service Provider Modifiers Qty    63361230365 HC ST EVAL ORAL PHARYNG SWALLOW 4 6/2/2020 Teri Christie MS CCC-SLP GN 1    [id number] HC ST EVAL SPEECH AND PROD W LANG  2 6/2/2020 Teri Christie MS CCC-SLP GN 1                     Teri Christie MS CCC-SLP  6/2/2020   [facility]

## 2021-10-26 NOTE — CONSULT NOTE ADULT - CONSULT REASON
Small left temporal subarachnoid hemorrhage and small subdural hematoma along the left tentorial leaflet.

## 2021-10-26 NOTE — PHYSICAL THERAPY INITIAL EVALUATION ADULT - IMPAIRMENTS CONTRIBUTING TO GAIT DEVIATIONS, PT EVAL
Pt often crossing midline with both LEs, several LOB to right side ferdinand. on turns, several stepping strategies used to maintain upright position with PT assist to maintain safety./ataxic/impaired balance/narrow base of support/impaired postural control

## 2021-10-26 NOTE — PHYSICAL THERAPY INITIAL EVALUATION ADULT - CRITERIA FOR SKILLED THERAPEUTIC INTERVENTIONS
Home with RW, 24/7 assist for safety with mobility vs Acute./impairments found/rehab potential/anticipated equipment needs at discharge/anticipated discharge recommendation

## 2021-10-26 NOTE — OCCUPATIONAL THERAPY INITIAL EVALUATION ADULT - PERTINENT HX OF CURRENT PROBLEM, REHAB EVAL
76M, history of HLD on medications and history of CABG in 5/2021 on ASA post procedure, had a fall on gravel and hit the back of his head, no LOC, presented to [place] and was discovered to have a SAH.  Transferred to [facility] for tertiary care. Repeat CTH stable, Pelvic xray (-) for acute findings.

## 2021-10-26 NOTE — H&P ADULT - NSHPPHYSICALEXAM_GEN_ALL_CORE
Primary  Airway intact  Breathing equal bilaterally  Peripheral and central circulation intact   Disability GCS 15  Exposure Abrasions over posterior cranium, hemostatic     Secondary  No neurological deficits  CVS: S1, S2 heard  RS: EWOB  Abd: Soft, NT, ND, Pelvis stable

## 2021-10-26 NOTE — ED ADULT NURSE NOTE - CHIEF COMPLAINT QUOTE
transfer from [place] S/P fall onto gravel earlier this afternoon. Hit the back of his head.   no LOC.  On aspirin daily.  PT with subdural bleed.  Neuro intact.  Denies pain at this time. misty BUTLER activated.

## 2021-10-26 NOTE — PHYSICAL THERAPY INITIAL EVALUATION ADULT - PERTINENT HX OF CURRENT PROBLEM, REHAB EVAL
76M, history of HLD on medications and history of CABG in 5/2021 on ASA post procedure, had a fall on gravel and hit the back of his head, no LOC, presented to [place] and was discovered to have a SAH.  Transferred to [facility] for tertiary care. Repeat CTH stable, Pelvic xray (-) for acute findings.

## 2021-10-26 NOTE — PHYSICAL THERAPY INITIAL EVALUATION ADULT - ADDITIONAL COMMENTS
Pt lives alone in a private home. 3 steps to enter with bilateral handrails Not within reach, no steps inside. (+) , Pt was independent PTA without an assist device. Pt does not own DME except to have grab bars in his bath and by his toilet.

## 2021-10-26 NOTE — ED PROVIDER NOTE - SKIN, MLM
Skin normal color for race, warm, dry and intact. No evidence of rash. Large abrasion to posterior scalp region

## 2021-10-26 NOTE — CHART NOTE - NSCHARTNOTEFT_GEN_A_CORE
HPI: 76M, history of HLD on medications and history of CABG in 5/2020 on ASA post procedure, had a fall today on gravel and hit the back of his head, no LOC, presented to [place] and was discovered to have a SAH.  Transferred to [facility] for tertiary care.  Denies headache, denies pain elsewhere, denies dizziness, blurred vision.      Hospital Course:   10/26: admitted, repeat CT stable, spoke with cardiologist who said ok to hold ASA x2 weeks then restart at 81mg qd.     PHYSICAL EXAM:  General: Calm, laying in bed  HEENT: MMM  Neuro:  -Mental status- No acute distress, AOx3, conversational, following commands  -CN- PERRL 3mm, EOMI, tongue midline, face symmetric  -Motor- full strength in all ext  -Sensation- intact to LT   -Coordination- no dysmetria noted    CV: RRR  Pulm: Clear to auscultation  Abd: Soft, nontender, nondistended  Ext: No edema  Skin: warm, dry    Plan:   Neuro: q4 neuro checks, continue home Seroquel, Lamotrigine, Xanax PRN, Bupropion.   Cards: -140, continue lipitor. Per patients cardiologist ASA can be held for 2 weeks then restart at 81mg, f/u with him in office 2 weeks.   Resp: on room air   GI: Cardiac diet, senna PRN   : d/c IVF now that ondiet, voiding   Heme: SCDs only for DVT ppx   ID: afebrile   Endo: no active issues   PT eval pending   Downgrade to any tele floor under nsg, pt signed out.

## 2021-10-27 ENCOUNTER — TRANSCRIPTION ENCOUNTER (OUTPATIENT)
Age: 76
End: 2021-10-27

## 2021-10-27 VITALS
RESPIRATION RATE: 18 BRPM | DIASTOLIC BLOOD PRESSURE: 80 MMHG | TEMPERATURE: 98 F | SYSTOLIC BLOOD PRESSURE: 101 MMHG | HEART RATE: 69 BPM | OXYGEN SATURATION: 97 %

## 2021-10-27 PROBLEM — E78.00 PURE HYPERCHOLESTEROLEMIA, UNSPECIFIED: Chronic | Status: ACTIVE | Noted: 2021-10-25

## 2021-10-27 PROBLEM — I25.810 ATHEROSCLEROSIS OF CORONARY ARTERY BYPASS GRAFT(S) WITHOUT ANGINA PECTORIS: Chronic | Status: ACTIVE | Noted: 2021-10-25

## 2021-10-27 PROBLEM — F41.9 ANXIETY DISORDER, UNSPECIFIED: Chronic | Status: ACTIVE | Noted: 2021-10-25

## 2021-10-27 LAB
ANION GAP SERPL CALC-SCNC: 12 MMOL/L — SIGNIFICANT CHANGE UP (ref 5–17)
BUN SERPL-MCNC: 18.6 MG/DL — SIGNIFICANT CHANGE UP (ref 8–20)
CALCIUM SERPL-MCNC: 8.9 MG/DL — SIGNIFICANT CHANGE UP (ref 8.6–10.2)
CHLORIDE SERPL-SCNC: 102 MMOL/L — SIGNIFICANT CHANGE UP (ref 98–107)
CO2 SERPL-SCNC: 25 MMOL/L — SIGNIFICANT CHANGE UP (ref 22–29)
COVID-19 SPIKE DOMAIN AB INTERP: POSITIVE
COVID-19 SPIKE DOMAIN ANTIBODY RESULT: 81.2 U/ML — HIGH
CREAT SERPL-MCNC: 0.96 MG/DL — SIGNIFICANT CHANGE UP (ref 0.5–1.3)
CULTURE RESULTS: SIGNIFICANT CHANGE UP
GLUCOSE SERPL-MCNC: 96 MG/DL — SIGNIFICANT CHANGE UP (ref 70–99)
HCT VFR BLD CALC: 41.5 % — SIGNIFICANT CHANGE UP (ref 39–50)
HCV AB S/CO SERPL IA: 0.08 S/CO — SIGNIFICANT CHANGE UP (ref 0–0.99)
HCV AB SERPL-IMP: SIGNIFICANT CHANGE UP
HGB BLD-MCNC: 13.6 G/DL — SIGNIFICANT CHANGE UP (ref 13–17)
MAGNESIUM SERPL-MCNC: 2.2 MG/DL — SIGNIFICANT CHANGE UP (ref 1.6–2.6)
MCHC RBC-ENTMCNC: 30.2 PG — SIGNIFICANT CHANGE UP (ref 27–34)
MCHC RBC-ENTMCNC: 32.8 GM/DL — SIGNIFICANT CHANGE UP (ref 32–36)
MCV RBC AUTO: 92.2 FL — SIGNIFICANT CHANGE UP (ref 80–100)
PHOSPHATE SERPL-MCNC: 3.7 MG/DL — SIGNIFICANT CHANGE UP (ref 2.4–4.7)
PLATELET # BLD AUTO: 119 K/UL — LOW (ref 150–400)
POTASSIUM SERPL-MCNC: 4.5 MMOL/L — SIGNIFICANT CHANGE UP (ref 3.5–5.3)
POTASSIUM SERPL-SCNC: 4.5 MMOL/L — SIGNIFICANT CHANGE UP (ref 3.5–5.3)
RBC # BLD: 4.5 M/UL — SIGNIFICANT CHANGE UP (ref 4.2–5.8)
RBC # FLD: 13.2 % — SIGNIFICANT CHANGE UP (ref 10.3–14.5)
SARS-COV-2 IGG+IGM SERPL QL IA: 81.2 U/ML — HIGH
SARS-COV-2 IGG+IGM SERPL QL IA: POSITIVE
SODIUM SERPL-SCNC: 139 MMOL/L — SIGNIFICANT CHANGE UP (ref 135–145)
SPECIMEN SOURCE: SIGNIFICANT CHANGE UP
WBC # BLD: 6.11 K/UL — SIGNIFICANT CHANGE UP (ref 3.8–10.5)
WBC # FLD AUTO: 6.11 K/UL — SIGNIFICANT CHANGE UP (ref 3.8–10.5)

## 2021-10-27 PROCEDURE — 86803 HEPATITIS C AB TEST: CPT

## 2021-10-27 PROCEDURE — 93005 ELECTROCARDIOGRAM TRACING: CPT

## 2021-10-27 PROCEDURE — 84100 ASSAY OF PHOSPHORUS: CPT

## 2021-10-27 PROCEDURE — 97163 PT EVAL HIGH COMPLEX 45 MIN: CPT

## 2021-10-27 PROCEDURE — 86769 SARS-COV-2 COVID-19 ANTIBODY: CPT

## 2021-10-27 PROCEDURE — 99232 SBSQ HOSP IP/OBS MODERATE 35: CPT

## 2021-10-27 PROCEDURE — 85025 COMPLETE CBC W/AUTO DIFF WBC: CPT

## 2021-10-27 PROCEDURE — 80048 BASIC METABOLIC PNL TOTAL CA: CPT

## 2021-10-27 PROCEDURE — 85730 THROMBOPLASTIN TIME PARTIAL: CPT

## 2021-10-27 PROCEDURE — 82962 GLUCOSE BLOOD TEST: CPT

## 2021-10-27 PROCEDURE — 80053 COMPREHEN METABOLIC PANEL: CPT

## 2021-10-27 PROCEDURE — 83735 ASSAY OF MAGNESIUM: CPT

## 2021-10-27 PROCEDURE — 70450 CT HEAD/BRAIN W/O DYE: CPT | Mod: MH

## 2021-10-27 PROCEDURE — 72170 X-RAY EXAM OF PELVIS: CPT

## 2021-10-27 PROCEDURE — 85027 COMPLETE CBC AUTOMATED: CPT

## 2021-10-27 PROCEDURE — 85610 PROTHROMBIN TIME: CPT

## 2021-10-27 PROCEDURE — 36415 COLL VENOUS BLD VENIPUNCTURE: CPT

## 2021-10-27 RX ORDER — ALIROCUMAB 75 MG/ML
1 INJECTION, SOLUTION SUBCUTANEOUS
Qty: 0 | Refills: 0 | DISCHARGE

## 2021-10-27 RX ORDER — LAMOTRIGINE 25 MG/1
1 TABLET, ORALLY DISINTEGRATING ORAL
Qty: 0 | Refills: 0 | DISCHARGE

## 2021-10-27 RX ORDER — VORTIOXETINE 5 MG/1
1 TABLET, FILM COATED ORAL
Qty: 0 | Refills: 0 | DISCHARGE

## 2021-10-27 RX ORDER — EZETIMIBE 10 MG/1
1 TABLET ORAL
Qty: 0 | Refills: 0 | DISCHARGE

## 2021-10-27 RX ORDER — ROSUVASTATIN CALCIUM 5 MG/1
1 TABLET ORAL
Qty: 0 | Refills: 0 | DISCHARGE

## 2021-10-27 RX ORDER — ASPIRIN/CALCIUM CARB/MAGNESIUM 324 MG
1 TABLET ORAL
Qty: 0 | Refills: 0 | DISCHARGE

## 2021-10-27 RX ORDER — ACETAMINOPHEN 500 MG
2 TABLET ORAL
Qty: 0 | Refills: 0 | DISCHARGE
Start: 2021-10-27

## 2021-10-27 RX ORDER — ALPRAZOLAM 0.25 MG
1 TABLET ORAL
Qty: 0 | Refills: 0 | DISCHARGE

## 2021-10-27 RX ORDER — BUPROPION HYDROCHLORIDE 150 MG/1
1 TABLET, EXTENDED RELEASE ORAL
Qty: 0 | Refills: 0 | DISCHARGE

## 2021-10-27 RX ORDER — QUETIAPINE FUMARATE 200 MG/1
1 TABLET, FILM COATED ORAL
Qty: 0 | Refills: 0 | DISCHARGE

## 2021-10-27 RX ADMIN — BUPROPION HYDROCHLORIDE 300 MILLIGRAM(S): 150 TABLET, EXTENDED RELEASE ORAL at 11:11

## 2021-10-27 RX ADMIN — LAMOTRIGINE 100 MILLIGRAM(S): 25 TABLET, ORALLY DISINTEGRATING ORAL at 11:11

## 2021-10-27 NOTE — DISCHARGE NOTE PROVIDER - HOSPITAL COURSE
HPI: 76M, history of HLD on medications and history of CABG in 5/2020 on ASA post procedure, had a fall today on gravel and hit the back of his head, no LOC, presented to [place] and was discovered to have a SDH.  Transferred to [facility] for tertiary care.  Denies headache, denies pain elsewhere, denies dizziness, blurred vision.     Hospital Course:   Patient transferred from [place] with SDH, follow up scan stable. Pt neuro intact. Per his Cardiologist able to hold ASA x2 weeks and resume 81mg after. PT evaluated the patient and recommended home with home PT. Neurosurgery cleared for discharge.     < from: CT Head No Cont (10.26.21 @ 05:10) >    IMPRESSION:    Small left paratentorial subdural bleed and small focus of anterior right parafalcine extra-axial bleed without significant mass effect or shift.    < end of copied text >

## 2021-10-27 NOTE — DISCHARGE NOTE PROVIDER - NSDCFUADDINST_GEN_ALL_CORE_FT
DO NOT TAKE ASPIRIN, NSAIDS, IBUPROFEN, OR ANY OTHER BLOOD THINNER FOR 2 WEEKS. Starting November 9th you may resume your Aspirin at 81mg one time per day. Take Tylenol as needed for headache.

## 2021-10-27 NOTE — CHART NOTE - NSCHARTNOTEFT_GEN_A_CORE
Tertiary Trauma Survey (TTS)    Date of TTS:  10/27/21                            Time: 1330  Admit Date: 10/26/21                            Trauma Activation: 0300  Admit GCS: E-4     V-5     M-6     HPI:  76M, history of HLD on medications and history of CABG in 2020 on ASA post procedure, had a fall today on gravel and hit the back of his head, no LOC, presented to [place] and was discovered to have a SAH.  Transferred to [facility] for tertiary care.  Denies headache, denies pain elsewhere, denies dizziness, blurred vision.   (26 Oct 2021 03:00)    PAST MEDICAL & SURGICAL HISTORY:  CAD (coronary artery disease) of artery bypass graft    High cholesterol    Anxiety    Hyperlipidemia    S/P CABG x 4      [  ] No significant past history as reviewed with the patient and family    FAMILY HISTORY:    [  ] Family history not pertinent as reviewed with the patient and family    SOCIAL HISTORY:    Medications (inpatient): atorvastatin 80 milliGRAM(s) Oral at bedtime  buPROPion XL (24-Hour) . 300 milliGRAM(s) Oral daily  lamoTRIgine 100 milliGRAM(s) Oral daily  QUEtiapine 25 milliGRAM(s) Oral at bedtime    Medications (PRN):acetaminophen     Tablet .. 650 milliGRAM(s) Oral every 6 hours PRN  ALPRAZolam 0.25 milliGRAM(s) Oral every 6 hours PRN  labetalol Injectable 10 milliGRAM(s) IV Push every 3 hours PRN  senna 2 Tablet(s) Oral at bedtime PRN    Allergies: No Known Allergies  (Intolerances: )    Vital Signs Last 24 Hrs  T(C): 36.5 (27 Oct 2021 15:52), Max: 36.9 (26 Oct 2021 20:30)  T(F): 97.7 (27 Oct 2021 15:52), Max: 98.4 (26 Oct 2021 20:30)  HR: 69 (27 Oct 2021 15:52) (64 - 77)  BP: 101/80 (27 Oct 2021 15:52) (101/80 - 150/86)  BP(mean): --  RR: 18 (27 Oct 2021 15:52) (18 - 18)  SpO2: 97% (27 Oct 2021 15:52) (96% - 100%)  Drug Dosing Weight  Height (cm): 172.7 (26 Oct 2021 08:00)  Weight (kg): 80.7 (26 Oct 2021 08:00)  BMI (kg/m2): 27.1 (26 Oct 2021 08:00)  BSA (m2): 1.94 (26 Oct 2021 08:00)    PHYSICAL EXAM  GEN: NAD, resting quietly  HEENT: NCAT  Head: Abrasions to occipital  NEURO: awake, alert  PULM: symmetric chest rise bilaterally, no chest wall tenderness, no crepitus  CV: regular rate, peripheral pulses intact  GI: soft, NTND  EXTR: no cyanosis or edema, moving all extremities, no deformity, no tenderness                          13.6   6.11  )-----------( 119      ( 27 Oct 2021 07:10 )             41.5     10-27    139  |  102  |  18.6  ----------------------------<  96  4.5   |  25.0  |  0.96    Ca    8.9      27 Oct 2021 07:10  Phos  3.7     10-27  Mg     2.2     10-27    TPro  7.1  /  Alb  4.4  /  TBili  0.6  /  DBili  x   /  AST  26  /  ALT  16  /  AlkPhos  81  10    PT/INR - ( 26 Oct 2021 02:57 )   PT: 13.4 sec;   INR: 1.16 ratio         PTT - ( 26 Oct 2021 02:57 )  PTT:28.0 sec  Urinalysis Basic - ( 26 Oct 2021 00:21 )    Color: Yellow / Appearance: Clear / S.010 / pH: x  Gluc: x / Ketone: Negative  / Bili: Negative / Urobili: Negative mg/dL   Blood: x / Protein: 15 mg/dL / Nitrite: Negative   Leuk Esterase: Negative / RBC: 0-2 /HPF / WBC x   Sq Epi: x / Non Sq Epi: Occasional / Bacteria: Occasional    List Injuries Identified to Date:  Abrasions to back of head  SAH    List Operative and Interventional Radiological Procedures:     Consults (Date):  [ 10/26/21 ] Neurosurgery   [  ] Orthopedics  [  ] Plastics  [  ] Urology  [  ] PM&R  [  ] Social Work    RADIOLOGICAL FINDINGS REVIEW:  CXR:    Pelvis Films:   Xray Pelvis AP only (10.26.21 @ 03:05) >    INTERPRETATION:  Clinical history: 76-year-old male, trauma.    Portable view of the pelvis without comparison demonstrates mild degenerative change with no fracture or dislocation.    An interface projected over the right intertrochanteric region represents a skinfold.    C-Spine Films:    T/L/S Spine Films:    Extremity Films:    Head CT:  CT Head No Cont (10.26.21 @ 05:10) >    FINDINGS:    Tiny punctate hyperattenuating focus along the right anterior parafalcine region due to smallsubdural or subarachnoid blood measures approximately 6 mm (2-42). Asymmetric hyperattenuation along the left leaflet of the tentorium cerebelli measures 3 mm, reflecting small subdural bleed.    Patchy and confluent regions of periventricular and deep cerebral white matter hypoattenuation due to chronic microangiopathic ischemic changes. Atheromatous calcifications along the carotid siphons are present.    Mild centrally predominant cerebral volume loss noted.  No evidence of hydrocephalus. Basal cisterns are patent.    Visualized paranasal sinuses and mastoid air cells are clear. Calvarium is intact. Hyperostosis frontalis interna.    C-Spine CT:    Neck CT:    Chest CT:    ABD/Pelvis CT:    Other:    Interpretation of Findings:  CT Head No Cont (10.26.21 @ 05:10) >    IMPRESSION:    Small left paratentorial subdural bleed and small focus of anterior right parafalcine extra-axial bleed without significant mass effect or shift.    Xray Pelvis AP only (10.26.21 @ 03:05) >    No acute findings in this AP portable radiograph, recommend follow-up as clinically indicated    IMPRESSION:  No acute findings in this AP portable radiograph, recommend follow-up as clinically indicated    < end of copied text >      HPI:  76M, history of HLD on medications and history of CABG in 2020 on ASA post procedure, had a fall today on gravel and hit the back of his head, no LOC, presented to [place] and was discovered to have a SAH.  Transferred to [facility] for tertiary care.  Denies headache, denies pain elsewhere, denies dizziness, blurred vision.   (26 Oct 2021 03:00)  .  Tertiary exam performed []. Findings as above. No additional injuries identified.    PLAN  - Pain control  - Diet  - Consult recs: Neuro said safe for DC. PT at home. Cards: Hold asa for 2 wks

## 2021-10-27 NOTE — DISCHARGE NOTE NURSING/CASE MANAGEMENT/SOCIAL WORK - PATIENT PORTAL LINK FT
You can access the FollowMyHealth Patient Portal offered by [facility] by registering at the following website: [email or web address]. By joining [facility]’s FollowMyHealth portal, you will also be able to view your health information using other applications (apps) compatible with our system.

## 2021-10-27 NOTE — DISCHARGE NOTE PROVIDER - NSDCMRMEDTOKEN_GEN_ALL_CORE_FT
acetaminophen 325 mg oral tablet: 2 tab(s) orally every 6 hours, As needed, Mild Pain (1 - 3)  ALPRAZolam 0.25 mg oral tablet: 1 tab(s) orally every 6 hours, As Needed  buPROPion 300 mg/24 hours (XL) oral tablet, extended release: 1 tab(s) orally every 24 hours  ezetimibe 10 mg oral tablet: 1 tab(s) orally once a day  lamoTRIgine 100 mg oral tablet, extended release: 1 tab(s) orally once a day  Praluent Pen 75 mg/mL subcutaneous solution: 1 applicator subcutaneous every 2 weeks  QUEtiapine 25 mg oral tablet: 1 tab(s) orally once a day (at bedtime)  rosuvastatin 40 mg oral tablet: 1 tab(s) orally once a day  Trintellix 20 mg oral tablet: 1 tab(s) orally once a day

## 2022-07-31 ENCOUNTER — NON-APPOINTMENT (OUTPATIENT)
Age: 77
End: 2022-07-31

## 2022-11-02 NOTE — DISCHARGE NOTE PROVIDER - CARE PROVIDER_API CALL
Health Maintenance Due   Topic Date Due   • Hepatitis B Vaccine (1 of 3 - 3-dose series) Never done   • Pneumococcal Vaccine 0-64 (1 - PCV) Never done   • Depression Screening  03/03/2022   • COVID-19 Vaccine (4 - Booster for Pfizer series) 03/07/2022   • Cervical Cancer Screen 30-64 -  08/28/2022   • Influenza Vaccine (1) 09/01/2022       Patient is due for topics as listed above but is not proceeding with Immunization(s) COVID-19, Hep B, Influenza and Pneumococcal and Cervical cancer screening at this time. Education provided for Immunization(s) COVID-19, Hep B, Influenza and Pneumococcal and Cervical cancer screening.   Irving Jensen)  Neurosurgery  [address] [place] [address]  Phone: [phone]  Fax: [phone]  Follow Up Time: 2 weeks

## 2023-02-06 ENCOUNTER — NON-APPOINTMENT (OUTPATIENT)
Age: 78
End: 2023-02-06

## 2023-02-18 ENCOUNTER — NON-APPOINTMENT (OUTPATIENT)
Age: 78
End: 2023-02-18

## 2023-11-02 ENCOUNTER — NON-APPOINTMENT (OUTPATIENT)
Age: 78
End: 2023-11-02

## 2025-06-05 NOTE — OCCUPATIONAL THERAPY INITIAL EVALUATION ADULT - NS ASR TOILETING EQUIP NEEDS
Pt response noted.  Provider response not warranted at this time.      Please note - there is no recent record of controlled medication dispensing in the [place] or [place] PDMP.  Script written in November 2024 for lisdexamfetamine 50mg to a [facility] in [place] was picked up in December, 2024.   Script sent in January, 2025 to the [place] [facility] was never picked up.  In March, 2025, script was sent to a [facility] in [place] which has no record of the patient on file.      I would caution, going forward, prescribing stimulants to patient as it seems difficult to track dispensing and adherence.  UDS with ED admission in Feb, 2025 was negative for amphetamines which may be consistent with last known dispense in December. 2025.    I would encourage pt to have Pain Management Profile within the [facility] system to ensure adherence to treatment plan.     Encounter closed at this time     3:1 commode